# Patient Record
Sex: MALE | Race: WHITE | NOT HISPANIC OR LATINO | Employment: UNEMPLOYED | URBAN - METROPOLITAN AREA
[De-identification: names, ages, dates, MRNs, and addresses within clinical notes are randomized per-mention and may not be internally consistent; named-entity substitution may affect disease eponyms.]

---

## 2021-09-15 ENCOUNTER — HOSPITAL ENCOUNTER (INPATIENT)
Facility: CLINIC | Age: 21
LOS: 4 days | Discharge: HOME | End: 2021-09-20
Attending: EMERGENCY MEDICINE | Admitting: PSYCHIATRY & NEUROLOGY
Payer: MEDICAID

## 2021-09-15 DIAGNOSIS — Z11.52 ENCOUNTER FOR SCREENING LABORATORY TESTING FOR SEVERE ACUTE RESPIRATORY SYNDROME CORONAVIRUS 2 (SARS-COV-2): ICD-10-CM

## 2021-09-15 DIAGNOSIS — F43.23 ADJUSTMENT DISORDER WITH MIXED ANXIETY AND DEPRESSED MOOD: ICD-10-CM

## 2021-09-15 DIAGNOSIS — F10.10 ALCOHOL ABUSE: ICD-10-CM

## 2021-09-15 DIAGNOSIS — F33.0 MAJOR DEPRESSIVE DISORDER, RECURRENT EPISODE, MILD (H): Primary | ICD-10-CM

## 2021-09-15 DIAGNOSIS — F12.10 MARIJUANA ABUSE: ICD-10-CM

## 2021-09-15 LAB
ALCOHOL BREATH TEST: 0 (ref 0–0.01)
AMPHETAMINES UR QL SCN: ABNORMAL
BARBITURATES UR QL: ABNORMAL
BENZODIAZ UR QL: ABNORMAL
CANNABINOIDS UR QL SCN: ABNORMAL
COCAINE UR QL: ABNORMAL
OPIATES UR QL SCN: ABNORMAL
SARS-COV-2 RNA RESP QL NAA+PROBE: NEGATIVE

## 2021-09-15 PROCEDURE — 82075 ASSAY OF BREATH ETHANOL: CPT | Performed by: EMERGENCY MEDICINE

## 2021-09-15 PROCEDURE — 99284 EMERGENCY DEPT VISIT MOD MDM: CPT | Performed by: EMERGENCY MEDICINE

## 2021-09-15 PROCEDURE — 90791 PSYCH DIAGNOSTIC EVALUATION: CPT

## 2021-09-15 PROCEDURE — 99285 EMERGENCY DEPT VISIT HI MDM: CPT | Mod: 25 | Performed by: EMERGENCY MEDICINE

## 2021-09-15 PROCEDURE — 80307 DRUG TEST PRSMV CHEM ANLYZR: CPT | Performed by: EMERGENCY MEDICINE

## 2021-09-15 PROCEDURE — C9803 HOPD COVID-19 SPEC COLLECT: HCPCS | Performed by: EMERGENCY MEDICINE

## 2021-09-15 PROCEDURE — U0005 INFEC AGEN DETEC AMPLI PROBE: HCPCS | Performed by: EMERGENCY MEDICINE

## 2021-09-15 ASSESSMENT — ENCOUNTER SYMPTOMS
DYSPHORIC MOOD: 1
HALLUCINATIONS: 0
SLEEP DISTURBANCE: 0

## 2021-09-15 NOTE — ED TRIAGE NOTES
Patient seeking help for SI  Father with patient   Patient tried to hang himself a couple months ago.

## 2021-09-16 PROBLEM — R45.851 SUICIDAL IDEATION: Status: ACTIVE | Noted: 2021-09-16

## 2021-09-16 PROCEDURE — 250N000013 HC RX MED GY IP 250 OP 250 PS 637: Performed by: NURSE PRACTITIONER

## 2021-09-16 PROCEDURE — H0001 ALCOHOL AND/OR DRUG ASSESS: HCPCS | Performed by: COUNSELOR

## 2021-09-16 PROCEDURE — 124N000002 HC R&B MH UMMC

## 2021-09-16 PROCEDURE — 99223 1ST HOSP IP/OBS HIGH 75: CPT | Mod: AI | Performed by: NURSE PRACTITIONER

## 2021-09-16 RX ORDER — IBUPROFEN 200 MG
200 TABLET ORAL EVERY 4 HOURS PRN
COMMUNITY

## 2021-09-16 RX ORDER — OLANZAPINE 10 MG/2ML
10 INJECTION, POWDER, FOR SOLUTION INTRAMUSCULAR 3 TIMES DAILY PRN
Status: DISCONTINUED | OUTPATIENT
Start: 2021-09-16 | End: 2021-09-20 | Stop reason: HOSPADM

## 2021-09-16 RX ORDER — LOPERAMIDE HCL 2 MG
2 CAPSULE ORAL 4 TIMES DAILY PRN
Status: DISCONTINUED | OUTPATIENT
Start: 2021-09-16 | End: 2021-09-20 | Stop reason: HOSPADM

## 2021-09-16 RX ORDER — IBUPROFEN 600 MG/1
600 TABLET, FILM COATED ORAL EVERY 6 HOURS PRN
Status: DISCONTINUED | OUTPATIENT
Start: 2021-09-16 | End: 2021-09-20 | Stop reason: HOSPADM

## 2021-09-16 RX ORDER — FOLIC ACID 1 MG/1
1 TABLET ORAL DAILY
Status: DISCONTINUED | OUTPATIENT
Start: 2021-09-16 | End: 2021-09-20 | Stop reason: HOSPADM

## 2021-09-16 RX ORDER — MAGNESIUM HYDROXIDE/ALUMINUM HYDROXICE/SIMETHICONE 120; 1200; 1200 MG/30ML; MG/30ML; MG/30ML
30 SUSPENSION ORAL EVERY 4 HOURS PRN
Status: DISCONTINUED | OUTPATIENT
Start: 2021-09-16 | End: 2021-09-20 | Stop reason: HOSPADM

## 2021-09-16 RX ORDER — OLANZAPINE 10 MG/1
10 TABLET ORAL 3 TIMES DAILY PRN
Status: DISCONTINUED | OUTPATIENT
Start: 2021-09-16 | End: 2021-09-20 | Stop reason: HOSPADM

## 2021-09-16 RX ORDER — LANOLIN ALCOHOL/MO/W.PET/CERES
100 CREAM (GRAM) TOPICAL DAILY
Status: DISCONTINUED | OUTPATIENT
Start: 2021-09-16 | End: 2021-09-20 | Stop reason: HOSPADM

## 2021-09-16 RX ORDER — ACETAMINOPHEN 325 MG/1
650 TABLET ORAL EVERY 4 HOURS PRN
Status: DISCONTINUED | OUTPATIENT
Start: 2021-09-16 | End: 2021-09-20 | Stop reason: HOSPADM

## 2021-09-16 RX ORDER — HYDROXYZINE HYDROCHLORIDE 25 MG/1
25 TABLET, FILM COATED ORAL EVERY 4 HOURS PRN
Status: DISCONTINUED | OUTPATIENT
Start: 2021-09-16 | End: 2021-09-16

## 2021-09-16 RX ORDER — DIAZEPAM 5 MG
5-20 TABLET ORAL EVERY 30 MIN PRN
Status: DISCONTINUED | OUTPATIENT
Start: 2021-09-16 | End: 2021-09-17

## 2021-09-16 RX ORDER — VENLAFAXINE HYDROCHLORIDE 37.5 MG/1
75 TABLET, EXTENDED RELEASE ORAL
Status: DISCONTINUED | OUTPATIENT
Start: 2021-09-16 | End: 2021-09-20 | Stop reason: HOSPADM

## 2021-09-16 RX ORDER — LANOLIN ALCOHOL/MO/W.PET/CERES
3 CREAM (GRAM) TOPICAL
Status: DISCONTINUED | OUTPATIENT
Start: 2021-09-16 | End: 2021-09-20 | Stop reason: HOSPADM

## 2021-09-16 RX ORDER — GABAPENTIN 100 MG/1
200 CAPSULE ORAL 3 TIMES DAILY PRN
Status: DISCONTINUED | OUTPATIENT
Start: 2021-09-16 | End: 2021-09-20 | Stop reason: HOSPADM

## 2021-09-16 RX ORDER — MULTIPLE VITAMINS W/ MINERALS TAB 9MG-400MCG
1 TAB ORAL DAILY
Status: DISCONTINUED | OUTPATIENT
Start: 2021-09-16 | End: 2021-09-20 | Stop reason: HOSPADM

## 2021-09-16 RX ORDER — ONDANSETRON 4 MG/1
4 TABLET, ORALLY DISINTEGRATING ORAL EVERY 6 HOURS PRN
Status: DISCONTINUED | OUTPATIENT
Start: 2021-09-16 | End: 2021-09-20 | Stop reason: HOSPADM

## 2021-09-16 RX ADMIN — MULTIPLE VITAMINS W/ MINERALS TAB 1 TABLET: TAB at 11:55

## 2021-09-16 RX ADMIN — VENLAFAXINE HYDROCHLORIDE 75 MG: 37.5 TABLET, EXTENDED RELEASE ORAL at 11:55

## 2021-09-16 RX ADMIN — THIAMINE HCL TAB 100 MG 100 MG: 100 TAB at 11:55

## 2021-09-16 RX ADMIN — FOLIC ACID 1 MG: 1 TABLET ORAL at 11:55

## 2021-09-16 ASSESSMENT — ACTIVITIES OF DAILY LIVING (ADL)
HYGIENE/GROOMING: INDEPENDENT
HYGIENE/GROOMING: HANDWASHING;INDEPENDENT
ORAL_HYGIENE: INDEPENDENT
DRESS: INDEPENDENT
DRESS: STREET CLOTHES;SCRUBS (BEHAVIORAL HEALTH);INDEPENDENT
ORAL_HYGIENE: INDEPENDENT
FALL_HISTORY_WITHIN_LAST_SIX_MONTHS: NO
LAUNDRY: WITH SUPERVISION
DIFFICULTY_EATING/SWALLOWING: NO
HYGIENE/GROOMING: INDEPENDENT
DOING_ERRANDS_INDEPENDENTLY_DIFFICULTY: NO
ORAL_HYGIENE: INDEPENDENT
WALKING_OR_CLIMBING_STAIRS_DIFFICULTY: NO
DRESS: INDEPENDENT
WEAR_GLASSES_OR_BLIND: YES
DIFFICULTY_COMMUNICATING: NO
CONCENTRATING,_REMEMBERING_OR_MAKING_DECISIONS_DIFFICULTY: YES
DRESSING/BATHING_DIFFICULTY: NO
LAUNDRY: WITH SUPERVISION
LAUNDRY: WITH SUPERVISION
TOILETING_ISSUES: NO

## 2021-09-16 ASSESSMENT — MIFFLIN-ST. JEOR: SCORE: 1916.18

## 2021-09-16 NOTE — PROGRESS NOTES
"Pt admitted to [facility] from [facility], Voluntary with suicidal ideation and no specific plan at this time,appeared,sad and depressed. Pt said he was tired, however, stated he is \"here to get help,\" cooperative during admission process.    Pt says he has had previous psych admissions prior to this one and also has a history of substance abuse utox positive for cannabinoids. He said he couldn't remember but said he wanted to hang himself in July. He feels safe while on the unit and would like to be \"put on medications that work,\" and treatment. Pt is currently homeless. Pt has slept approximately 2.75 Hours during the night, on Status 15 minute safety checks, no prn's given.  "
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
## 2021-09-16 NOTE — PROGRESS NOTES
09/16/21 0234   Patient Belongings   Did you bring any home meds/supplements to the hospital?  Yes   Patient Belongings locker;sent to security per site process   Patient Belongings Put in Hospital Secure Location (Security or Locker, etc.) cash/credit card;cell phone/electronics;clothing;shoes   Belongings Search Yes   Clothing Search Yes   Second Staff Harvey   Comment See Note     In Locker: 2 sweatshirts, 1 pair of pant, 1 belt, 1 [place] Identification card, 4 chargers, 1 speaker, 1 cell phone, 1 back bag, 1 Ipod, 2 lighter, and 1 ear pod    Sent to security: 1 visa card end #[id number]   A               Admission:  I am responsible for any personal items that are not sent to the safe or pharmacy.  [facility] is not responsible for loss, theft or damage of any property in my possession.    Signature:  _________________________________ Date: _______  Time: _____                                              Staff Signature:  ____________________________ Date: ________  Time: _____      2nd Staff person, if patient is unable/unwilling to sign:    Signature: ________________________________ Date: ________  Time: _____     Discharge:  [facility] has returned all of my personal belongings:    Signature: _________________________________ Date: ________  Time: _____                                          Staff Signature:  ____________________________ Date: ________  Time: _____
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
## 2021-09-16 NOTE — PLAN OF CARE
Problem: General Plan of Care (Inpatient Behavioral)  Goal: Individualization/Patient Specific Goal (IP Behavioral)  Description: The patient and/or their representative will achieve their patient-specific goals related to the plan of care.    The patient-specific goals include:  Flowsheets (Taken 9/16/2021 1401)  Areas of Vulnerability: Homeless, unemployed, substance use problems  Patient Strengths:   Awareness of substance use issues   Motivated and ready for change  Note: Personal Plan of Care    Reasons you are in the Hospital  Suicidal  2.Depression  3. Homeless  4. Substance use.    Goals for Discharge   Get into [facility]  2.    Get life back on track.

## 2021-09-16 NOTE — SAFE
Vignesh Donahue  September 15, 2021  SAFE Note    Critical Safety Issues: SI       Current Suicidal Ideation/Self-Injurious Concerns/Methods: Other hanging      Current or Historical Inappropriate Sexual Behavior: No      Current or Historical Aggression/Homicidal Ideation: Agitation/Hyperactivity as a child. No current.     Updated care team: Yes: MD and RN     For additional details see full [facility] assessment.       Kathrine Burgess, [facility] HospitalSW

## 2021-09-16 NOTE — PLAN OF CARE
BEHAVIORAL TEAM DISCUSSION    Participants: JOSE Freitas, NIKI Gurrola, Elizabeth Buckley, RN  Progress: Minimal, just admitted  Anticipated length of stay: 5-7 days.  Continued Stay Criteria/Rationale: Pt is reporting suicidal ideations with intend to act on thoughts.  Medical/Physical: No  Precautions:   Behavioral Orders   Procedures    Code 1 - Restrict to Unit    Discontinue 1:1 attendant for suicide risk     Order Specific Question:   I have performed an in person assessment of the patient     Answer:   Based on this assessment the patient no longer requires a one on one attendant at this point in time.     Order Specific Question:   Rationale     Answer:   Patient States able to remain safe in hospital    Routine Programming     As clinically indicated    Status 15     Every 15 minutes.    Suicide precautions     Patients on Suicide Precautions should have a Combination Diet ordered that includes a Diet selection(s) AND a Behavioral Tray selection for Safe Tray - with utensils, or Safe Tray - NO utensils      Withdrawal precautions     Plan: The plan is to assess the patient for mental health and medication needs.  The patient will be prescribed medications to treat the identified symptoms.  Upon discharge the patient will be referred to services as appropriate based on the assessment.  Pt will be referred for Substance Use Evaluation.  Rationale for change in precautions or plan: No changes.

## 2021-09-16 NOTE — PLAN OF CARE
Pt. Willing to engage in a 1:1 with staff.  Pt. States that he is comfortable on the unit, is able to make requests of staff, and has no questions at this time.  Pt. Reports that the phone interview for a CD assessment went well, that he plans on attending CD treatment.  The pt. States that he has been in the hospital on a psychiatric unit and he feels safe on the unit.  The pt. Denies suicidal ideation, states is motivated for CD treatment.  The pt.'s MSSA is 3; the pt. Does not have any withdrawal symptoms.  The pt.'s thoughts appear reality based and clear.

## 2021-09-16 NOTE — PHARMACY-ADMISSION MEDICATION HISTORY
Admission Medication History Completed by Pharmacy    See [facility] Admission Navigator for allergy information, preferred outpatient pharmacy, prior to admission medications and immunization status.     Medication history sources:  Patient via phone; Chart review     Pertinent changes made to PTA medication list:  Added:   - Ibuprofen 200 mg tablets (per patient)   Deleted: N/A  Changed: N/A    Additional medication history information:   - Patient reports not taking any prescription medications in over a year.   - Patient denies taking any additional Rx/OTC medications other than the ones listed below.    Prior to Admission medications    Medication Sig Last Dose Taking? Auth Provider   ibuprofen (ADVIL/MOTRIN) 200 MG tablet Take 200 mg by mouth every 4 hours as needed for mild pain or other (headache) Past Month at PR[facility] Yes Unknown, Entered By History          Date completed: 09/16/21    Medication history completed by:   Madeline Dawn, PharmD   [facility]  [phone]
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
## 2021-09-16 NOTE — PLAN OF CARE
"Problem: Suicidal Behavior  Goal: Suicidal Behavior is Absent or Managed  Outcome: Initial Assessment-Developing     Pt observed resting on his bed or sleeping during the shift. Is easily aroused by voice. Reports he is here because \"I thought about hanging myself.\" Reports this, coupled his being homeless, and his girlfriend breaking up with him today was difficult. States, \"Two days ago I was sleeping in the woods; this is great.\" Reports that he couldn't continue with his shelter use because he violated curfew. Reports that he feels safe on the unit, stating, \"I've been to [facility] before.\" Reports feels tired and desires to rest. Denies depression; reports anxiety, rating it \"5\"/10; reports that this rating is related to getting \"my life back together\" and understanding what the plan of care for him will be moving forward. Denies hallucinations, SI/SIBI, HI, and pain. Agrees to notify staff if he feels unsafe. Reports that he sometimes has upper chest pain and it \"feels like mucus\" when he coughs deeply. Reports had diarrhea this morning, but not at present. MSSA score x2 is 1. VS WNL during first VS check during shift. Evening systolic BP elevated, other VS WNL; pt denies SOB, dizziness, and pain. Informed him that dinner was saved; pt doesn't desire to eat due to feeling nauseous. Offered PRN Zofran; pt declines, stating again that he just wants to rest due to feeling tired. Encouraged pt to maintain adequate hydration; offered water intake which he received and drank. VS rechecked at 2220 and are WNL. Pt reports that he will likely drink the juice in his room with ice at this time.  "

## 2021-09-17 LAB
ALBUMIN SERPL-MCNC: 3.9 G/DL (ref 3.4–5)
ALP SERPL-CCNC: 60 U/L (ref 40–150)
ALT SERPL W P-5'-P-CCNC: 35 U/L (ref 0–70)
ANION GAP SERPL CALCULATED.3IONS-SCNC: 5 MMOL/L (ref 3–14)
AST SERPL W P-5'-P-CCNC: 21 U/L (ref 0–45)
BASOPHILS # BLD AUTO: 0.1 10E3/UL (ref 0–0.2)
BASOPHILS NFR BLD AUTO: 1 %
BILIRUB SERPL-MCNC: 0.5 MG/DL (ref 0.2–1.3)
BUN SERPL-MCNC: 11 MG/DL (ref 7–30)
CALCIUM SERPL-MCNC: 9.1 MG/DL (ref 8.5–10.1)
CHLORIDE BLD-SCNC: 106 MMOL/L (ref 94–109)
CHOLEST SERPL-MCNC: 184 MG/DL
CO2 SERPL-SCNC: 25 MMOL/L (ref 20–32)
CREAT SERPL-MCNC: 0.81 MG/DL (ref 0.66–1.25)
EOSINOPHIL # BLD AUTO: 0 10E3/UL (ref 0–0.7)
EOSINOPHIL NFR BLD AUTO: 0 %
ERYTHROCYTE [DISTWIDTH] IN BLOOD BY AUTOMATED COUNT: 12.3 % (ref 10–15)
FOLATE SERPL-MCNC: 14.2 NG/ML
GFR SERPL CREATININE-BSD FRML MDRD: >90 ML/MIN/1.73M2
GGT SERPL-CCNC: 43 U/L (ref 0–75)
GLUCOSE BLD-MCNC: 77 MG/DL (ref 70–99)
HCT VFR BLD AUTO: 48.1 % (ref 40–53)
HDLC SERPL-MCNC: 102 MG/DL
HGB BLD-MCNC: 16.1 G/DL (ref 13.3–17.7)
HIV 1+2 AB+HIV1 P24 AG SERPL QL IA: NONREACTIVE
IMM GRANULOCYTES # BLD: 0 10E3/UL
IMM GRANULOCYTES NFR BLD: 0 %
LDLC SERPL CALC-MCNC: 70 MG/DL
LYMPHOCYTES # BLD AUTO: 0.9 10E3/UL (ref 0.8–5.3)
LYMPHOCYTES NFR BLD AUTO: 15 %
MCH RBC QN AUTO: 29.4 PG (ref 26.5–33)
MCHC RBC AUTO-ENTMCNC: 33.5 G/DL (ref 31.5–36.5)
MCV RBC AUTO: 88 FL (ref 78–100)
MONOCYTES # BLD AUTO: 0.8 10E3/UL (ref 0–1.3)
MONOCYTES NFR BLD AUTO: 13 %
NEUTROPHILS # BLD AUTO: 4 10E3/UL (ref 1.6–8.3)
NEUTROPHILS NFR BLD AUTO: 71 %
NONHDLC SERPL-MCNC: 82 MG/DL
NRBC # BLD AUTO: 0 10E3/UL
NRBC BLD AUTO-RTO: 0 /100
PLATELET # BLD AUTO: 264 10E3/UL (ref 150–450)
POTASSIUM BLD-SCNC: 4.2 MMOL/L (ref 3.4–5.3)
PROT SERPL-MCNC: 7.8 G/DL (ref 6.8–8.8)
RBC # BLD AUTO: 5.47 10E6/UL (ref 4.4–5.9)
SODIUM SERPL-SCNC: 136 MMOL/L (ref 133–144)
T PALLIDUM AB SER QL: NONREACTIVE
T4 FREE SERPL-MCNC: 0.97 NG/DL (ref 0.76–1.46)
TRIGL SERPL-MCNC: 62 MG/DL
TSH SERPL DL<=0.005 MIU/L-ACNC: 0.18 MU/L (ref 0.4–4)
VIT B12 SERPL-MCNC: 609 PG/ML (ref 193–986)
WBC # BLD AUTO: 5.7 10E3/UL (ref 4–11)

## 2021-09-17 PROCEDURE — 82040 ASSAY OF SERUM ALBUMIN: CPT | Performed by: NURSE PRACTITIONER

## 2021-09-17 PROCEDURE — 87389 HIV-1 AG W/HIV-1&-2 AB AG IA: CPT | Performed by: NURSE PRACTITIONER

## 2021-09-17 PROCEDURE — 124N000002 HC R&B MH UMMC

## 2021-09-17 PROCEDURE — 80061 LIPID PANEL: CPT | Performed by: NURSE PRACTITIONER

## 2021-09-17 PROCEDURE — 36415 COLL VENOUS BLD VENIPUNCTURE: CPT | Performed by: NURSE PRACTITIONER

## 2021-09-17 PROCEDURE — 84443 ASSAY THYROID STIM HORMONE: CPT | Performed by: NURSE PRACTITIONER

## 2021-09-17 PROCEDURE — 87591 N.GONORRHOEAE DNA AMP PROB: CPT | Performed by: NURSE PRACTITIONER

## 2021-09-17 PROCEDURE — 85025 COMPLETE CBC W/AUTO DIFF WBC: CPT | Performed by: NURSE PRACTITIONER

## 2021-09-17 PROCEDURE — 84439 ASSAY OF FREE THYROXINE: CPT | Performed by: NURSE PRACTITIONER

## 2021-09-17 PROCEDURE — 82746 ASSAY OF FOLIC ACID SERUM: CPT | Performed by: NURSE PRACTITIONER

## 2021-09-17 PROCEDURE — 82607 VITAMIN B-12: CPT | Performed by: NURSE PRACTITIONER

## 2021-09-17 PROCEDURE — 86780 TREPONEMA PALLIDUM: CPT | Performed by: NURSE PRACTITIONER

## 2021-09-17 PROCEDURE — 82977 ASSAY OF GGT: CPT | Performed by: NURSE PRACTITIONER

## 2021-09-17 PROCEDURE — 87491 CHLMYD TRACH DNA AMP PROBE: CPT | Performed by: NURSE PRACTITIONER

## 2021-09-17 PROCEDURE — 250N000013 HC RX MED GY IP 250 OP 250 PS 637: Performed by: NURSE PRACTITIONER

## 2021-09-17 PROCEDURE — 99232 SBSQ HOSP IP/OBS MODERATE 35: CPT | Performed by: NURSE PRACTITIONER

## 2021-09-17 RX ADMIN — THIAMINE HCL TAB 100 MG 100 MG: 100 TAB at 08:57

## 2021-09-17 RX ADMIN — MULTIPLE VITAMINS W/ MINERALS TAB 1 TABLET: TAB at 08:57

## 2021-09-17 RX ADMIN — VENLAFAXINE HYDROCHLORIDE 75 MG: 37.5 TABLET, EXTENDED RELEASE ORAL at 08:57

## 2021-09-17 RX ADMIN — FOLIC ACID 1 MG: 1 TABLET ORAL at 08:57

## 2021-09-17 ASSESSMENT — ACTIVITIES OF DAILY LIVING (ADL)
HYGIENE/GROOMING: INDEPENDENT
LAUNDRY: WITH SUPERVISION
ORAL_HYGIENE: INDEPENDENT
LAUNDRY: WITH SUPERVISION
ORAL_HYGIENE: INDEPENDENT
HYGIENE/GROOMING: INDEPENDENT
DRESS: INDEPENDENT
DRESS: INDEPENDENT

## 2021-09-17 NOTE — PLAN OF CARE
Assessment/Intervention/Current Symtoms and Care Coordination  Attended team meeting and addressed patient concerns  Reviewed chart notes.  Met with patient and obtained social security number    Contacted [facility] Clinical Review team ([phone]) - they were able to provide patient's PMI #[id number] and that patient's insurance is currently inactive    Contacted [facility] office (*[id number]) and spoke with Jihan, gave her the SSN and PMI numbers.  She will have someone from the business office contact the pt on the unit re: getting MA reinstated    Contacted [facility] [phone]/fax [phone] to determine whether they would accept a referral with MA pending.  She said they would.  Writer faxed TRACI assessment to Niranjan in intake at [facility]    Entered avoidable days    Updated AVS    Discharge Plan or Goal  3 days, to residential TRACI treatment at [facility] or an equal alternative     Barriers to Discharge   Insurance, creating further vulnerability     Referral Status  [facility]     Legal Status  Voluntary

## 2021-09-17 NOTE — PROGRESS NOTES
NOC Shift Report    Pt in bed at beginning of shift, breathing quiet and unlabored. Pt slept through shift.    No pt complaints or concerns at this time.   No PRNs given. Will continue to monitor.

## 2021-09-17 NOTE — PROGRESS NOTES
"CLINICAL NUTRITION SERVICES - ASSESSMENT NOTE     Nutrition Prescription    RECOMMENDATIONS FOR MDs/PROVIDERS TO ORDER:  None at this time    Malnutrition Status:    Unable to assess--need hx and NFPE    Recommendations already ordered by Registered Dietitian (RD):  Continue current diet orders     Future/Additional Recommendations:  Monitor intake, weight, availability, need for supplement--add prn     REASON FOR ASSESSMENT  Vignesh Donahue is a/an 21 year old male assessed by the dietitian for MST score of 3: positive  for weight loss of 14-23 lbs and positive  for poor oral intake related to decreased appetite    NUTRITION HISTORY  - Unable to obtain nutrition history- pt sleeping  - pt is notably homeless- high risk for food insecurity  - NKFA    CURRENT NUTRITION ORDERS  Diet: Regular  Intake/Tolerance: nausea noted last night, ate a good breakfast, didn't eat lunch per RN    LABS  Labs reviewed- electrolytes, inflammatory markers WNL      MEDICATIONS    folic acid  1 mg Oral Daily     multivitamin w/minerals  1 tablet Oral Daily     thiamine  100 mg Oral Daily     venlafaxine  75 mg Oral Daily with breakfast        ANTHROPOMETRICS  Height: 188 cm (6' 2\")  Most Recent Weight: 83.6 kg (184 lb 6.4 oz)    IBW: 86.3 kg  Body mass index is 23.68 kg/m .  BMI: Normal BMI  Weight History:   Wt Readings from Last 10 Encounters:   09/16/21 83.6 kg (184 lb 6.4 oz)   02/04/15 69.9 kg (154 lb) (95 %, Z= 1.61)*   08/12/12 68.9 kg (152 lb) (>99 %, Z= 2.44)*   07/15/12 68 kg (150 lb) (>99 %, Z= 2.43)*   07/03/12 62.6 kg (137 lb 14.4 oz) (99 %, Z= 2.19)*   10/06/11 60.8 kg (134 lb) (>99 %, Z= 2.37)*   08/19/10 47.2 kg (104 lb) (98 %, Z= 2.09)*   03/23/10 39.9 kg (88 lb) (96 %, Z= 1.71)*   07/28/09 33.3 kg (73 lb 8 oz) (91 %, Z= 1.32)*   10/26/07 24.5 kg (54 lb) (79 %, Z= 0.80)*     * Growth percentiles are based on CDC (Boys, 2-20 Years) data.     Very limited weight records, no Care Everywhere records. Unable to quantify " weight changes at this time.    Dosing Weight: 83.6 kg, current    ASSESSED NUTRITION NEEDS  Estimated Energy Needs: 7687-7651 kcals/day (25 - 30 kcals/kg)  Justification: Maintenance  Estimated Protein Needs:  grams protein/day (1-1.5)  Justification: Repletion  Estimated Fluid Needs:  (1 mL/kcal)   Justification: Maintenance    MALNUTRITION  % Intake: Unable to assess  % Weight Loss: Unable to assess  Subcutaneous Fat Loss: Unable to assess  Muscle Loss: Unable to assess  Fluid Accumulation/Edema: Unable to assess  Malnutrition Diagnosis: Unable to determine    NUTRITION DIAGNOSIS  Inadequate oral intake related to mental health symptom exacerbation as well as possible recent food insecurity as evidenced by report of poor PO PTA and weight loss.      INTERVENTIONS  Implementation  Nutrition Education: Unable to complete due to pt unavailable   Continue current diet orders, check in with patient early next week     Goals  Patient to consume % of nutritionally adequate meal trays TID, or the equivalent with supplements/snacks.     Monitoring/Evaluation  Progress toward goals will be monitored and evaluated per protocol.  Denice Hart RD, LD  ICU/5A/OB/Mental Health Pager (M-F): [phone]  On Call Pager (weekends only): [phone]

## 2021-09-17 NOTE — PROGRESS NOTES
[facility],  Psychiatric Progress Note      Impression:     Vignesh Donahue is a 21-year-old male admitted to [facility] on 9/16/2021.  He was admitted as a voluntary patient through the ER due to depressive symptoms and suicidal ideation with a plan to hang himself.  He was evicted from his [address] 8 apartment 7/31/2021 due to drug use.  He is homeless.  He was staying at [facility] and was kicked out due to breaking curfew and using substances.  He spent the night outdoors just prior to admission.  He smokes cannabis daily.  He recently started using K2 and uses several times per week.  He consumes 1 L of vodka or whiskey daily with last use the AM of 9/15.  UTOX was positive for cannabinoids.  Breathalyzer was 0.  He has not taken medications for over 1 year.  Since admission, Effexor ER was initiated.  PRNs of Neurontin, Melatonin and Zyprexa were initiated.  He was on the MSSA but did not require treatment with Valium.  Mood is improving and he denies suicidal ideation.         Diagnoses:     Major depressive disorder, severe, recurrent, without psychosis  Social anxiety disorder  Rule out PTSD  Alcohol use disorder, severe, dependence  Cannabis use disorder  Nicotine use disorder         Plan:     Medications:  Continue Effexor ER 75 mg daily.  PRNs of Neurontin, Melatonin and Zyprexa are available.     Labs including STD testing pending.     Discontinue MSSA.     CD consult completed 9/16 with recommendation for [facility].  Discharge to CD treatment when a bed is available.          Attestation:  Patient has been seen and evaluated by me, NIKI Bull CNP  The patient was counseled on nature of illness and treatment plan/options  Care was coordinated with treatment team         Clinical Global Impressions  First:  Considering your total clinical experience with this particular patient population, how severe are the  patient's symptoms at this time?: 6 (09/16/21 1046)  Compared to the patient's condition at the START of treatment, this patient's condition is: 4 (09/16/21 1046)  Most recent:  Considering your total clinical experience with this particular patient population, how severe are the patient's symptoms at this time?: 6 (09/16/21 1046)  Compared to the patient's condition at the START of treatment, this patient's condition is: 4 (09/16/21 1046)           Interim History:     The patient's care was discussed with the treatment team and chart notes were reviewed.  Pt was documented as sleeping 7 hours during the overnight shift.  MSSA scores have been low and he has not required treatment with Valium.  He complains of diarrhea.  Discussed that PRN Imodium is available.  He otherwise feels well physically.  States mood is improved and he denies suicidal ideation.  He completed his CD evaluation yesterday and is hoping for discharge to CD treatment soon.  No insurance is noted in his chart, but he states he does have insurance.  CTC notified.  Pt inquired about keeping his phone on his room.  Advised him this is not allowed but did approve brief use of phone under staff supervision to text friends and family to give them info regarding his whereabouts.          Medications:     Current Facility-Administered Medications   Medication     acetaminophen (TYLENOL) tablet 650 mg     alum & mag hydroxide-simethicone (MAALOX) suspension 30 mL     folic acid (FOLVITE) tablet 1 mg     gabapentin (NEURONTIN) capsule 200 mg     ibuprofen (ADVIL/MOTRIN) tablet 600 mg     loperamide (IMODIUM) capsule 2 mg     melatonin tablet 3 mg     multivitamin w/minerals (THERA-VIT-M) tablet 1 tablet     nicotine (NICORETTE) gum 4 mg     OLANZapine (zyPREXA) tablet 10 mg    Or     OLANZapine (zyPREXA) injection 10 mg     ondansetron (ZOFRAN-ODT) ODT tab 4 mg     thiamine (B-1) tablet 100 mg     venlafaxine (EFFEXOR-ER) 24 hr tablet 75 mg              "Allergies:     Allergies   Allergen Reactions     Abilify Discmelt      dystonia     Dust Mites      Seasonal Allergies             Psychiatric Examination:     /72   Pulse 75   Temp 98.7  F (37.1  C) (Oral)   Resp 16   Ht 1.88 m (6' 2\")   Wt 83.6 kg (184 lb 6.4 oz)   SpO2 97%   BMI 23.68 kg/m      Appearance:  awake, alert, adequate grooming  Attitude:  cooperative  Eye Contact:  minimal  Mood:  better, more hopeful, less depressed, less anxious  Affect:  intensity is mildly heightened  Speech:  clear, coherent  Psychomotor Behavior:  no evidence of tardive dyskinesia, dystonia, or tics  Thought Process:  linear and goal oriented  Associations:  no loose associations  Thought Content:  no evidence of psychotic thought, denies homicidal ideation, denies suicidal ideation   Insight:  fair  Judgment:  fair  Oriented to:  date, time, person, and place  Attention Span and Concentration:  some impairment  Recent and Remote Memory:  fair  Language:  intact, fluent English  Fund of Knowledge:  appropriate  Muscle Strength and Tone:  normal  Gait and Station:  normal          Labs:     Recent Results (from the past 24 hour(s))   Comprehensive metabolic panel    Collection Time: 09/17/21  8:17 AM   Result Value Ref Range    Sodium 136 133 - 144 mmol/L    Potassium 4.2 3.4 - 5.3 mmol/L    Chloride 106 94 - 109 mmol/L    Carbon Dioxide (CO2) 25 20 - 32 mmol/L    Anion Gap 5 3 - 14 mmol/L    Urea Nitrogen 11 7 - 30 mg/dL    Creatinine 0.81 0.66 - 1.25 mg/dL    Calcium 9.1 8.5 - 10.1 mg/dL    Glucose 77 70 - 99 mg/dL    Alkaline Phosphatase 60 40 - 150 U/L    AST 21 0 - 45 U/L    ALT 35 0 - 70 U/L    Protein Total 7.8 6.8 - 8.8 g/dL    Albumin 3.9 3.4 - 5.0 g/dL    Bilirubin Total 0.5 0.2 - 1.3 mg/dL    GFR Estimate >90 >60 mL/min/1.73m2   TSH with free T4 reflex    Collection Time: 09/17/21  8:17 AM   Result Value Ref Range    TSH 0.18 (L) 0.40 - 4.00 mU/L   Lipid panel reflex to direct LDL    Collection Time: " 09/17/21  8:17 AM   Result Value Ref Range    Cholesterol 184 <200 mg/dL    Triglycerides 62 <150 mg/dL    Direct Measure  >=40 mg/dL    LDL Cholesterol Calculated 70 <=100 mg/dL    Non HDL Cholesterol 82 <130 mg/dL   GGT    Collection Time: 09/17/21  8:17 AM   Result Value Ref Range    GGT 43 0 - 75 U/L   CBC with platelets and differential    Collection Time: 09/17/21  8:17 AM   Result Value Ref Range    WBC Count 5.7 4.0 - 11.0 10e3/uL    RBC Count 5.47 4.40 - 5.90 10e6/uL    Hemoglobin 16.1 13.3 - 17.7 g/dL    Hematocrit 48.1 40.0 - 53.0 %    MCV 88 78 - 100 fL    MCH 29.4 26.5 - 33.0 pg    MCHC 33.5 31.5 - 36.5 g/dL    RDW 12.3 10.0 - 15.0 %    Platelet Count 264 150 - 450 10e3/uL    % Neutrophils 71 %    % Lymphocytes 15 %    % Monocytes 13 %    % Eosinophils 0 %    % Basophils 1 %    % Immature Granulocytes 0 %    NRBCs per 100 WBC 0 <1 /100    Absolute Neutrophils 4.0 1.6 - 8.3 10e3/uL    Absolute Lymphocytes 0.9 0.8 - 5.3 10e3/uL    Absolute Monocytes 0.8 0.0 - 1.3 10e3/uL    Absolute Eosinophils 0.0 0.0 - 0.7 10e3/uL    Absolute Basophils 0.1 0.0 - 0.2 10e3/uL    Absolute Immature Granulocytes 0.0 <=0.0 10e3/uL    Absolute NRBCs 0.0 10e3/uL   T4 free    Collection Time: 09/17/21  8:17 AM   Result Value Ref Range    Free T4 0.97 0.76 - 1.46 ng/dL

## 2021-09-17 NOTE — PLAN OF CARE
"Patient appeared a little irritable but was calm the rest of shift. Urine was sent for chlamydia and gonorrhea, still pending right now. Patient denied suicidal thoughts thoughts of self-harm. He asked about of his prn medications and was told what they are, but he didn't request any so far today. MSSA was 3 and was subsequently discontinued by provider. Patient has been visible in the milieu but withdrawn and did not attend groups. Will continue to monitor.Blood pressure 136/84, pulse 70, temperature 97.3  F (36.3  C), temperature source Temporal, resp. rate 16, height 1.88 m (6' 2\"), weight 83.6 kg (184 lb 6.4 oz), SpO2 98 %.   "

## 2021-09-18 LAB
C TRACH DNA SPEC QL NAA+PROBE: NEGATIVE
N GONORRHOEA DNA SPEC QL NAA+PROBE: NEGATIVE

## 2021-09-18 PROCEDURE — 124N000002 HC R&B MH UMMC

## 2021-09-18 PROCEDURE — 250N000013 HC RX MED GY IP 250 OP 250 PS 637: Performed by: NURSE PRACTITIONER

## 2021-09-18 RX ADMIN — VENLAFAXINE HYDROCHLORIDE 75 MG: 37.5 TABLET, EXTENDED RELEASE ORAL at 09:03

## 2021-09-18 RX ADMIN — MULTIPLE VITAMINS W/ MINERALS TAB 1 TABLET: TAB at 09:03

## 2021-09-18 RX ADMIN — THIAMINE HCL TAB 100 MG 100 MG: 100 TAB at 09:03

## 2021-09-18 RX ADMIN — FOLIC ACID 1 MG: 1 TABLET ORAL at 09:03

## 2021-09-18 ASSESSMENT — ACTIVITIES OF DAILY LIVING (ADL)
ORAL_HYGIENE: INDEPENDENT
HYGIENE/GROOMING: INDEPENDENT
DRESS: INDEPENDENT

## 2021-09-18 NOTE — PLAN OF CARE
Problem: Adult Inpatient Plan of Care  Goal: Plan of Care Review  9/18/2021 1521 by Cookie Peña, RN  Outcome: No Change  9/18/2021 1120 by Cookie Peña, RN  Outcome: No Change    Pt became frustrated and request 12 hour request to leave due to not having cell phone and wanting to go outside.  Pt signed intent and MD notified.  Pt changed his mind and decided to stay recognizing his decision was impulsive.

## 2021-09-18 NOTE — PLAN OF CARE
Problem: Adult Inpatient Plan of Care  Goal: Plan of Care Review  Outcome: Improving  Flowsheets (Taken 9/18/2021 1722)  Plan of Care Reviewed With: patient     Problem: Behavioral Health Plan of Care  Goal: Plan of Care Review  Outcome: Improving  Flowsheets (Taken 9/18/2021 1722)  Plan of Care Reviewed With: patient  Patient Agreement with Plan of Care: agrees     Problem: Suicidal Behavior  Goal: Suicidal Behavior is Absent or Managed  Outcome: Improving    Patient is pleasant, flat and blunted affect, calm, cooperative with assessments. Reports wanting to go home on Monday. Patient had initially signed a 12 hour intent to leave form, but rescinded. Patient is talking of wanting his phone on the unit, writer explained to the patient the reasons for not having cell phones on the unit, including the risk of HIPAA and patients' privacy; patient demonstrated understanding.    Patient is visible on the unit, watching movies, engaged with select peers.    Reports mild anxiety rated with admission, denies any other psych symptoms, contracts for safety.    Declined vital signs. Denies pain.    Staff will continue to monitor patient closely.

## 2021-09-18 NOTE — PLAN OF CARE
Calm, cooperative, pleasant upon approach and has been keeping to himself mostly. Reviewed plan of care for the evening. Said he has discharge plans for Monday and that he was going to go to treatment and then decided that the homeless shelter has more to offer him. Concerned about having proof to show the shelter that he was no longer in the hospital. Said the discharge papers that will be given to him are sufficient to show the shelter. Did not attend evening group.     Continues on suicide and withdrawal precautions

## 2021-09-18 NOTE — PLAN OF CARE
Problem: Adult Inpatient Plan of Care  Goal: Plan of Care Review  Outcome: No Change     Pt is pleasant and cooperative.  Pt has a full-range affect.  Pt speaks openly to writer about being homeless and his poly-substance abuse and is path to sobriety and reaching his goals.  Pt is out on unit but quiet.  Pt has good insight on his mental health and states his new meds seem to be working well for him.   Pt denies any mental health concerns at this time.

## 2021-09-19 VITALS
WEIGHT: 184.4 LBS | OXYGEN SATURATION: 97 % | BODY MASS INDEX: 23.66 KG/M2 | HEIGHT: 74 IN | RESPIRATION RATE: 16 BRPM | TEMPERATURE: 98 F | DIASTOLIC BLOOD PRESSURE: 82 MMHG | HEART RATE: 67 BPM | SYSTOLIC BLOOD PRESSURE: 125 MMHG

## 2021-09-19 PROCEDURE — 250N000013 HC RX MED GY IP 250 OP 250 PS 637: Performed by: NURSE PRACTITIONER

## 2021-09-19 PROCEDURE — 124N000002 HC R&B MH UMMC

## 2021-09-19 RX ADMIN — THIAMINE HCL TAB 100 MG 100 MG: 100 TAB at 09:32

## 2021-09-19 RX ADMIN — VENLAFAXINE HYDROCHLORIDE 75 MG: 37.5 TABLET, EXTENDED RELEASE ORAL at 09:32

## 2021-09-19 RX ADMIN — FOLIC ACID 1 MG: 1 TABLET ORAL at 09:33

## 2021-09-19 RX ADMIN — MULTIPLE VITAMINS W/ MINERALS TAB 1 TABLET: TAB at 09:32

## 2021-09-19 ASSESSMENT — ACTIVITIES OF DAILY LIVING (ADL)
DRESS: STREET CLOTHES
HYGIENE/GROOMING: INDEPENDENT
ORAL_HYGIENE: INDEPENDENT
HYGIENE/GROOMING: INDEPENDENT
ORAL_HYGIENE: INDEPENDENT
DRESS: INDEPENDENT

## 2021-09-19 NOTE — PLAN OF CARE
"Patient was \"trying to sleep in\" this morning and said he wasn't hungry for breakfast. Patient was pleasant upon approach and was compliant with medications. He denied suicidal thoughts, anxiety and auditory, visual hallucinations. Patient did not request any prn meds this shift. Patient had no requests to leave today. Nursing continues to monitor.  Patient declined vital signs this morning.  "

## 2021-09-19 NOTE — PLAN OF CARE
Pt appeared to sleep 6.75 hours. No safety issues or concerns reported. Safety checks done at least every 15 minutes. Will continue to monitor.

## 2021-09-19 NOTE — PLAN OF CARE
Problem: Behavioral Health Plan of Care  Goal: Plan of Care Review  Outcome: Improving  Flowsheets (Taken 9/19/2021 5597)  Plan of Care Reviewed With: patient  Patient Agreement with Plan of Care: agrees     Problem: Suicidal Behavior  Goal: Suicidal Behavior is Absent or Managed  Outcome: Improving     Problem: General Plan of Care (Inpatient Behavioral)  Goal: Individualization/Patient Specific Goal (IP Behavioral)  Description: The patient and/or their representative will achieve their patient-specific goals related to the plan of care.    The patient-specific goals include:  Outcome: Improving    Patient is pleasant, full range affect, visible on the unit, watching TV for the most part of the shift. Calm, cooperative with nursing assessment. Engaged with select peers.     Patient denies having any psych symptoms, contracts for safety.     Vital signs stable, denies pain.    Consumed dinner and snack.    No bedtime scheduled medication, no PRN requested.     Staff will continue to monitor patient closely.

## 2021-09-20 PROCEDURE — 99239 HOSP IP/OBS DSCHRG MGMT >30: CPT | Performed by: NURSE PRACTITIONER

## 2021-09-20 PROCEDURE — 250N000013 HC RX MED GY IP 250 OP 250 PS 637: Performed by: NURSE PRACTITIONER

## 2021-09-20 RX ORDER — FOLIC ACID 1 MG/1
1 TABLET ORAL DAILY
Qty: 30 TABLET | Refills: 1 | Status: SHIPPED | OUTPATIENT
Start: 2021-09-20 | End: 2024-09-11

## 2021-09-20 RX ORDER — MULTIPLE VITAMINS W/ MINERALS TAB 9MG-400MCG
1 TAB ORAL DAILY
Qty: 30 TABLET | Refills: 1 | Status: SHIPPED | OUTPATIENT
Start: 2021-09-20 | End: 2024-09-11

## 2021-09-20 RX ORDER — LANOLIN ALCOHOL/MO/W.PET/CERES
100 CREAM (GRAM) TOPICAL DAILY
Qty: 30 TABLET | Refills: 1 | Status: SHIPPED | OUTPATIENT
Start: 2021-09-20 | End: 2024-09-11

## 2021-09-20 RX ORDER — VENLAFAXINE HYDROCHLORIDE 75 MG/1
75 TABLET, EXTENDED RELEASE ORAL
Qty: 30 TABLET | Refills: 1 | Status: SHIPPED | OUTPATIENT
Start: 2021-09-20 | End: 2024-10-03

## 2021-09-20 RX ADMIN — MULTIPLE VITAMINS W/ MINERALS TAB 1 TABLET: TAB at 09:36

## 2021-09-20 RX ADMIN — VENLAFAXINE HYDROCHLORIDE 75 MG: 37.5 TABLET, EXTENDED RELEASE ORAL at 09:36

## 2021-09-20 RX ADMIN — THIAMINE HCL TAB 100 MG 100 MG: 100 TAB at 09:36

## 2021-09-20 RX ADMIN — FOLIC ACID 1 MG: 1 TABLET ORAL at 09:36

## 2021-09-20 NOTE — PLAN OF CARE
Assessment/Intervention/Current Symptoms and Care Coordination  Attend Team Meeting  Review Chart   Complete AVS    Discharge Plan or Goal  Discharge to shelter  Referral to [facility] urgent [facility]    Barriers to Discharge   Pt has no insurance    Referral Status  [facility] Urgent [facility]    Legal Status  Voluntary

## 2021-09-20 NOTE — DISCHARGE INSTRUCTIONS
Behavioral Discharge Planning and Instructions    Summary: You were admitted on 9/15/2021  due to Depression, Anxiety and Chemical Use Issues.  You were treated by Irene PRINCE and discharged on 09/20/2021 from [facility] to Shelter at [facility]. [address] [place] [address]  Phone: [phone]    Main Diagnosis:   Major depressive disorder, severe, recurrent, without psychosis  Social anxiety disorder  Rule out PTSD  Alcohol use disorder, severe, dependence  Cannabis use disorder  Nicotine use disorder    Health Care Follow-up:   You do not currently have active insurance. You have applied for insurance while here. While waiting for this to activate, you can go to the following clinics which offer free or sliding fee services:     [facility]  [address] [place] [address] [phone]    [facility] (also has dental services)  [address] [place] [address] - [phone]    [facility] (also has dental services)  [address] [place], [place] - [id number]  Phone: [phone]     [facility]  [address] [place] [address]  Hours: Monday, Wednesday, and Friday from 1-3pm; Monday through Thursday from 6:30pm-8:30pm   Phone: [phone]    No appointment is necessary; no paperwork; no fee.    [address] [place] [address] Triage Open Monday through Friday  Medical and Behavioral Health Triage is a new service at [address] [place] [address] that brings together community-based mental health agencies, [facility] , and [facility] to address our clients' complex needs. The goal of this program is to reach people that are not already receiving services or that are not already connected to case management who have an urgent concern related to their mental health or substance use disorder.   Care coordinators, peer recovery  specialists, and health professionals at [address] [place] [address] will address clients' physical health, mental health, addiction issues - and also the wrap-around services that they need to stay healthy, including housing, health insurance, and other resources.   Triage is located in [facility] at [address] [place] [address]. It is accessible by police from the parking lot. Everyone else can come through the front door of [address] [place] [address], and follow signs to [facility].     Triage hours:9:000 am - 5:00 pm  Triage Phone Number: [phone]  Location: [address] [place] [address]      Attend all scheduled appointments with your outpatient providers. Call at least 24 hours in advance if you need to reschedule an appointment to ensure continued access to your outpatient providers.     Major Treatments, Procedures and Findings:  You were provided with: a psychiatric assessment, assessed for medical stability, medication evaluation and/or management, group therapy and CD evaluation/assessment    Symptoms to Report: feeling more aggressive, increased confusion, losing more sleep, mood getting worse or thoughts of suicide    Early warning signs can include: increased depression or anxiety sleep disturbances increased thoughts or behaviors of suicide or self-harm  increased unusual thinking, such as paranoia or hearing voices    Safety and Wellness:  Take all medicines as directed.  Make no changes unless your doctor suggests them.      Follow treatment recommendations.  Refrain from alcohol and non-prescribed drugs.  Ask your support system to help you reduce your access to items that could harm yourself or others. If there is a concern for safety, call 911.    Resources:   [facility]  [facility] works to ensure that all who seek it have access to the support, care and resources they need to achieve long-term recovery from addiction.  [facility]'s mission is to strengthen the recovery community through vwim-gk-rtgp support, public  "education, and advocacy.  591.678.1000  www.[facility].org    [facility] Dual Diagnosis Support Group  [facility] Dual Diagnosis support group is a peer-led group for adults living both with a mental illness and an addiction. Addictions including sex, gambling, eating disorders, spending, smoking and chemical dependency among others.      The groups are free of charge and meet weekly for 90 minutes. In that time, attendees learn skills that help them deal with their addiction and gain support from people who understand the challenges of having a mental illness and an addiction.     No registration or enrollment required. Please join us for the next support group meeting.     Group Information:     Day and Time: Sundays, 4:00-5:30 pm     [facility], [address] [place] [address] 8th floor room 312   For more information, call Camilla at [phone].  Street parking is recommended. $2 to park in the ramp with a voucher you can get from the facilitators.    Crisis Intervention: 369.690.3235 or 828-902-6154 (TTY: 164.183.3315).  Call anytime for help.  National Dix on Mental Illness (www.[place].kareem.org): 733.379.2409 or 321-062-4339.  Alcoholics Anonymous (www.alcoholics-anonymous.org): Check your phone book for your local chapter.  National Suicide Prevention Line (www.mentalhealthmn.org): 694-653-EUPS (8544)  Mental Health Consumer/Survivor Network of [place] (www.mhcsn.net): 770.770.9896 or 765-589-6228  Mental Health Association of [place] (www.mentalhealth.org): 617.667.3124 or 467-922-0861  Self- Management and Recovery Training., SMART-- Toll free: 878.651.8190  www.[employer].org  [facility] Crisis (COPE) Response - Adult 012 634-0769  [facility] Crisis Response - Adult 248 670-2543  Crisis text line: Text \"[place]\" to 096347. Free, confidential, 24/7.  Crisis Intervention: 936.224.6979 or 514-294-8048. Call anytime for help.     General Medication Instructions: "   See your medication sheet(s) for instructions.   Take all medicines as directed.  Make no changes unless your doctor suggests them.   Go to all your doctor visits.  Be sure to have all your required lab tests. This way, your medicines can be refilled on time.  Do not use any drugs not prescribed by your doctor.  Avoid alcohol.    Advance Directives:   Scanned document on file with [facility]? No scanned doc  Is document scanned? Pt states no documents  Honoring Choices Your Rights Handout: Informed and given  Was more information offered? Materials given    The Treatment team has appreciated the opportunity to work with you. If you have any questions or concerns about your recent admission, you can contact the unit which can receive your call 24 hours a day, 7 days a week. They will be able to get in touch with a Provider if needed. The unit number is [phone]

## 2021-09-20 NOTE — DISCHARGE SUMMARY
"Psychiatric Discharge Summary    Vignesh Donahue MRN# [id number]   Age: 21 year old YOB: 2000     Date of Admission:  9/15/2021  Date of Discharge:  9/20/2021  Admitting Physician:  Yoana Houston MD  Discharge Physician:  NIKI Bull CNP (Contact: [phone])         Event Leading to Hospitalization:      From H&P 9/16/2021:    \"I'm homeless right now, kind of depressed, kind of hopeless because I'm sleeping outside and I got suicidal because of that.\"     Vignesh Donahue is a 21-year-old male admitted to [address] on 9/16/2021.  He was admitted as a voluntary patient through the ER due to depressive symptoms and suicidal ideation with a plan to hang himself.  He was evicted from his [place] [address] apartment 7/31/2021 due to drug use.  He is homeless.  He was staying at [facility] and was kicked out due to breaking curfew and using substances.  He spent the night outdoors just prior to admission.  He smokes cannabis daily.  He recently started using K2 and uses several times per week.  He consumes 1 L of vodka or whiskey daily with last use the AM of 9/15.  UTOX was positive for cannabinoids.  Breathalyzer was 0.  He has not taken medications for over 1 year.      His mood prior to being evicted last summer was \"happy.\"  His mood has become progressively more depressed since then.  He denies current suicidal ideation.  He reports anhedonia.  He said, \"My soul is definitely dead.\"  His sleep is good.  His appetite is lower than usual.  He has lost 20# in a period of several months.  His energy has been lower than usual.  Motivation is fairly low.  He has feelings of hopelessness and worthlessness.  He has some feelings of helplessness.  He struggles with anxiety.  \"I don't like crowds and I don't like making eye contact with people.\"  He denies muscle tension and restlessness.  He says he does not struggle with irritability but does present as such.  " He does have racing thoughts.  He denies panic attacks.  He denies symptoms consistent with kathy and psychosis.  He says he used to wash his hands until they bled and was preoccupied with keeping his room neat, but he denies these symptoms currently.  He denies homicidal ideation.  He has a history of abuse.  He denies intrusive thoughts, nightmares and flashbacks.  He denies avoidance behaviors.  He does feel hypervigilant.  He denies feel easily startled.  He does feel emotionally numb.       See full admission note by Irene Umaña NP 9/16/2021 for details.         Diagnoses:     Major depressive disorder, severe, recurrent, without psychosis  Social anxiety disorder  Rule out PTSD  Alcohol use disorder, severe, dependence  Cannabis use disorder  Nicotine use disorder         Labs:      Ref. Range 9/15/2021 19:39 9/15/2021 19:44 9/15/2021 21:36 9/17/2021 08:17 9/17/2021 10:29   Sodium Latest Ref Range: 133 - 144 mmol/L    136    Potassium Latest Ref Range: 3.4 - 5.3 mmol/L    4.2    Chloride Latest Ref Range: 94 - 109 mmol/L    106    Carbon Dioxide Latest Ref Range: 20 - 32 mmol/L    25    Urea Nitrogen Latest Ref Range: 7 - 30 mg/dL    11    Creatinine Latest Ref Range: 0.66 - 1.25 mg/dL    0.81    GFR Estimate Latest Ref Range: >60 mL/min/1.73m2    >90    Calcium Latest Ref Range: 8.5 - 10.1 mg/dL    9.1    Anion Gap Latest Ref Range: 3 - 14 mmol/L    5    Albumin Latest Ref Range: 3.4 - 5.0 g/dL    3.9    Protein Total Latest Ref Range: 6.8 - 8.8 g/dL    7.8    Bilirubin Total Latest Ref Range: 0.2 - 1.3 mg/dL    0.5    Alkaline Phosphatase Latest Ref Range: 40 - 150 U/L    60    ALT Latest Ref Range: 0 - 70 U/L    35    AST Latest Ref Range: 0 - 45 U/L    21    Cholesterol Latest Ref Range: <200 mg/dL    184    Folate Latest Ref Range: >=5.4 ng/mL    14.2    GGT Latest Ref Range: 0 - 75 U/L    43    HDL Cholesterol Latest Ref Range: >=40 mg/dL    102    LDL Cholesterol Calculated Latest Ref Range: <=100  mg/dL    70    Non HDL Cholesterol Latest Ref Range: <130 mg/dL    82    T4 Free Latest Ref Range: 0.76 - 1.46 ng/dL    0.97    Triglycerides Latest Ref Range: <150 mg/dL    62    TSH Latest Ref Range: 0.40 - 4.00 mU/L    0.18 (L)    Vitamin B12 Latest Ref Range: 193 - 986 pg/mL    609    Glucose Latest Ref Range: 70 - 99 mg/dL    77    WBC Latest Ref Range: 4.0 - 11.0 10e3/uL    5.7    Hemoglobin Latest Ref Range: 13.3 - 17.7 g/dL    16.1    Hematocrit Latest Ref Range: 40.0 - 53.0 %    48.1    Platelet Count Latest Ref Range: 150 - 450 10e3/uL    264    RBC Count Latest Ref Range: 4.40 - 5.90 10e6/uL    5.47    MCV Latest Ref Range: 78 - 100 fL    88    MCH Latest Ref Range: 26.5 - 33.0 pg    29.4    MCHC Latest Ref Range: 31.5 - 36.5 g/dL    33.5    RDW Latest Ref Range: 10.0 - 15.0 %    12.3    % Neutrophils Latest Units: %    71    % Lymphocytes Latest Units: %    15    % Monocytes Latest Units: %    13    % Eosinophils Latest Units: %    0    Absolute Basophils Latest Ref Range: 0.0 - 0.2 10e3/uL    0.1    % Basophils Latest Units: %    1    Absolute Eosinophils Latest Ref Range: 0.0 - 0.7 10e3/uL    0.0    Absolute Immature Granulocytes Latest Ref Range: <=0.0 10e3/uL    0.0    Absolute Lymphocytes Latest Ref Range: 0.8 - 5.3 10e3/uL    0.9    Absolute Monocytes Latest Ref Range: 0.0 - 1.3 10e3/uL    0.8    % Immature Granulocytes Latest Units: %    0    Absolute Neutrophils Latest Ref Range: 1.6 - 8.3 10e3/uL    4.0    Absolute NRBCs Latest Units: 10e3/uL    0.0    NRBCs per 100 WBC Latest Ref Range: <1 /100    0    Treponema Antibodies Latest Ref Range: Nonreactive     Nonreactive    SARS CoV2 PCR Latest Ref Range: Negative    Negative     HIV Antigen Antibody Combo Latest Ref Range: Nonreactive     Nonreactive    Alcohol Breath Test Latest Ref Range: 0.00 - 0.01  0       Amphetamine Qual Urine Latest Ref Range: Screen Negative   Screen Negative      Cocaine Qual Urine Latest Ref Range: Screen Negative    Screen Negative      Benzodiazepine Qual Ur Latest Ref Range: Screen Negative   Screen Negative      Opiates Qualitative Urine Latest Ref Range: Screen Negative   Screen Negative      Cannabinoids Qual Urine Latest Ref Range: Screen Negative   Screen Positive (A)      Barbiturates Qual Urine Latest Ref Range: Screen Negative   Screen Negative      Chlamydia Trachomatis PCR Latest Ref Range: Negative      Negative   N Gonorrhea PCR Latest Ref Range: Negative      Negative            Consults:     Chemical dependency consult was completed 9/16/2021 but no referrals could be placed as he did not have active insurance.          Hospital Course:     Vignesh Donahue was admitted to [facility] with attending Yoana Houston MD, under the direct care of Irene Umaña NP as a voluntary patient.  The patient was placed under status 15 (15 minute checks) to ensure patient safety.     MSSA protocol was initiated due to the patient's history of alcohol abuse and concern for withdrawal symptoms.  He did not exhibit any signs or symptoms of withdrawal and did not require treatment with Valium.    Effexor ER 75 mg daily was initiated.  He tolerated it well, without complaints of side effects.     Vignesh Donahue did participate in groups and was visible in the milieu.  Behavior was calm and cooperative.  He was offered the opportunity for CD treatment but stated that the supervisor at his shelter had informed him he could return, so he elected to pursue this option instead.  He indicated he was considering attending AA/NA and intended to maintain sobriety.     The patient's symptoms of depression improved.  He was hopeful and future-oriented.   He denied suicidal ideation.  He ate and slept well.  There was no evidence of psychosis.      Vignesh Donahue was released to a homeless shelter.  At the time of discharge Vignesh Donahue was determined to not be a danger to himself or others.          Discharge  "Medications:      Vignesh Donahue   Home Medication Instructions LEXIE:[id number]    Printed on:09/20/21 1143   Medication Information                      folic acid (FOLVITE) 1 MG tablet  Take 1 tablet (1 mg) by mouth daily             ibuprofen (ADVIL/MOTRIN) 200 MG tablet  Take 200 mg by mouth every 4 hours as needed for mild pain or other (headache)             multivitamin w/minerals (THERA-VIT-M) tablet  Take 1 tablet by mouth daily             thiamine (B-1) 100 MG tablet  Take 1 tablet (100 mg) by mouth daily             venlafaxine (EFFEXOR-ER) 75 MG 24 hr tablet  Take 1 tablet (75 mg) by mouth daily (with breakfast)                      Psychiatric Examination:     /82 (BP Location: Left arm)   Pulse 67   Temp 98  F (36.7  C) (Temporal)   Resp 16   Ht 1.88 m (6' 2\")   Wt 83.6 kg (184 lb 6.4 oz)   SpO2 97%   BMI 23.68 kg/m      Appearance:  awake, alert, adequate grooming  Attitude:  cooperative  Eye Contact:  good  Mood:  \"good\"  Affect:  normal range  Speech:  clear, coherent  Psychomotor Behavior:  no evidence of tardive dyskinesia, dystonia, or tics  Thought Process:  linear and goal oriented  Associations:  no loose associations  Thought Content:  no evidence of psychotic thought, denies homicidal ideation, denies suicidal ideation   Insight:  fair  Judgment:  fair  Oriented to:  date, time, person, and place  Attention Span and Concentration:  some impairment  Recent and Remote Memory:  fair  Language:  intact, fluent English  Fund of Knowledge:  appropriate  Muscle Strength and Tone:  normal  Gait and Station:  normal          Discharge Plan:     Summary: You were admitted on 9/15/2021 due to depression, anxiety and chemical use issues.  You were treated by Irene PRINCE and discharged on 09/20/2021 from [facility] to shelter at the [facility]. [address] [place] [address].  Phone: [phone]    Health Care Follow-up:   You do not currently have active " insurance. You have applied for insurance while here. While waiting for this to activate, you can go to the following clinics which offer free or sliding fee services:     [facility]  [address] [place] [address]  [phone]    [facility] (also has dental services)  [address] [place], [place] [address]. [address]  [place], [place] - [address]  [phone]    [facility] (also has dental services)  [address]  [place] - [address]  Phone: [phone]     [facility]  [address] [place] [address]  Hours: Monday, Wednesday, and Friday from 1-3pm; Monday through Thursday from 6:30pm-8:30pm   Phone: [phone]    No appointment is necessary; no paperwork; no fee.    [address] [place] [address] Triage Open Monday through Friday  Medical and Behavioral Health Triage is a new service at [address] [place] [address] that brings together community-based mental health agencies, [facility] , and [facility]) to address our clients' complex needs. The goal of this program is to reach people that are not already receiving services or that are not already connected to case management who have an urgent concern related to their mental health or substance use disorder.   Care coordinators, peer recovery specialists, and health professionals at [address] [place] [address] will address clients' physical health, mental health, addiction issues - and also the wrap-around services that they need to stay healthy, including housing, health insurance, and other resources.   Triage is located in [facility] at [address] [place] [address]. It is accessible by police from the parking lot. Everyone else can come through the front door of [address] [place] [address], and follow signs to [facility].     Triage hours:9:000 am - 5:00 pm  Triage Phone Number: [phone]  Location: [address] [place] [address]      He was provided with a 30-day supply of medications plus  one refill.  He was advised to take his medications as prescribed and to abstain from alcohol and illicit substances.       Attestation:  The patient has been seen and evaluated by me, NIKI Bull CNP   Discharge time > 30 minutes      Clinical Global Impressions  First:  Considering your total clinical experience with this particular patient population, how severe are the patient's symptoms at this time?: 6 (09/16/21 1046)  Compared to the patient's condition at the START of treatment, this patient's condition is: 4 (09/16/21 1046)  Most recent:  Considering your total clinical experience with this particular patient population, how severe are the patient's symptoms at this time?: 4 (09/20/21 0919)  Compared to the patient's condition at the START of treatment, this patient's condition is: 2 (09/20/21 0919)

## 2021-09-20 NOTE — PLAN OF CARE
Pt. States ready for discharge and is requesting discharge.  Pt. Reports no suicidal ideation or self-injurious behavior.  Pt. States that his thoughts are clear, reality based.  Pt. Reports that he understands his therapeutic discharge plan and medication regime , has no concerns or questions.  Pt. States that he will follow his therapeutic discharge plan and his medication regime.

## 2021-09-20 NOTE — PLAN OF CARE
Patient slept approximately 7.5 hours during the overnight shift; Currently in bed, appears to be comfortably asleep with even and unlabored breathing. Nursing will continue to monitor.

## 2024-07-25 ENCOUNTER — HOSPITAL ENCOUNTER (EMERGENCY)
Facility: HOSPITAL | Age: 24
Discharge: HOME | End: 2024-07-25
Admitting: PHYSICIAN ASSISTANT
Payer: COMMERCIAL

## 2024-07-25 ENCOUNTER — TELEPHONE (OUTPATIENT)
Dept: NURSING | Facility: CLINIC | Age: 24
End: 2024-07-25

## 2024-07-25 VITALS
OXYGEN SATURATION: 98 % | DIASTOLIC BLOOD PRESSURE: 70 MMHG | HEART RATE: 82 BPM | RESPIRATION RATE: 18 BRPM | SYSTOLIC BLOOD PRESSURE: 126 MMHG | TEMPERATURE: 98.2 F

## 2024-07-25 DIAGNOSIS — J06.9 URI (UPPER RESPIRATORY INFECTION): ICD-10-CM

## 2024-07-25 DIAGNOSIS — Z11.3 SCREEN FOR STD (SEXUALLY TRANSMITTED DISEASE): ICD-10-CM

## 2024-07-25 DIAGNOSIS — O35.5XX0 SUSPECTED DAMAGE TO FETUS FROM DRUGS, AFFECTING MANAGEMENT OF MOTHER, DELIVERED: ICD-10-CM

## 2024-07-25 LAB
FLUAV RNA SPEC QL NAA+PROBE: NEGATIVE
FLUBV RNA RESP QL NAA+PROBE: NEGATIVE
GROUP A STREP BY PCR: NOT DETECTED
HAV IGM SERPL QL IA: NONREACTIVE
HBV CORE IGM SERPL QL IA: NONREACTIVE
HBV SURFACE AG SERPL QL IA: NONREACTIVE
HCV AB SERPL QL IA: NONREACTIVE
HIV 1+2 AB+HIV1 P24 AG SERPL QL IA: NONREACTIVE
RSV RNA SPEC NAA+PROBE: NEGATIVE
SARS-COV-2 RNA RESP QL NAA+PROBE: POSITIVE
T PALLIDUM AB SER QL: NONREACTIVE

## 2024-07-25 PROCEDURE — 99283 EMERGENCY DEPT VISIT LOW MDM: CPT

## 2024-07-25 PROCEDURE — 86780 TREPONEMA PALLIDUM: CPT | Performed by: PHYSICIAN ASSISTANT

## 2024-07-25 PROCEDURE — 87651 STREP A DNA AMP PROBE: CPT | Performed by: PHYSICIAN ASSISTANT

## 2024-07-25 PROCEDURE — 87637 SARSCOV2&INF A&B&RSV AMP PRB: CPT | Performed by: PHYSICIAN ASSISTANT

## 2024-07-25 PROCEDURE — 87389 HIV-1 AG W/HIV-1&-2 AB AG IA: CPT | Performed by: PHYSICIAN ASSISTANT

## 2024-07-25 PROCEDURE — 87491 CHLMYD TRACH DNA AMP PROBE: CPT | Performed by: PHYSICIAN ASSISTANT

## 2024-07-25 PROCEDURE — 36415 COLL VENOUS BLD VENIPUNCTURE: CPT | Performed by: PHYSICIAN ASSISTANT

## 2024-07-25 PROCEDURE — 80074 ACUTE HEPATITIS PANEL: CPT | Performed by: PHYSICIAN ASSISTANT

## 2024-07-25 ASSESSMENT — ACTIVITIES OF DAILY LIVING (ADL): ADLS_ACUITY_SCORE: 35

## 2024-07-25 ASSESSMENT — COLUMBIA-SUICIDE SEVERITY RATING SCALE - C-SSRS
2. HAVE YOU ACTUALLY HAD ANY THOUGHTS OF KILLING YOURSELF IN THE PAST MONTH?: NO
6. HAVE YOU EVER DONE ANYTHING, STARTED TO DO ANYTHING, OR PREPARED TO DO ANYTHING TO END YOUR LIFE?: NO
1. IN THE PAST MONTH, HAVE YOU WISHED YOU WERE DEAD OR WISHED YOU COULD GO TO SLEEP AND NOT WAKE UP?: NO

## 2024-07-25 NOTE — DISCHARGE INSTRUCTIONS
Please call in the next couple of days for your results.  Please call and arrange a primary care appointment to establish care.  Do not hesitate to return to the ER if you feel like your cough is worsening or you develop new symptoms.

## 2024-07-25 NOTE — TELEPHONE ENCOUNTER
Patient calling for results. Advised of the positive Covid. Patient will call back in a couple of days for the rest of the results.     FERNIE JOHNSON RN

## 2024-07-25 NOTE — TELEPHONE ENCOUNTER
Patient classified as COVID treatment eligible by Epic high risk algorithm:  No    Coronavirus (COVID-19) Notification    Reason for call  Notify of POSITIVE COVID-19 lab result, assess symptoms,  review [facility] recommendations    Lab Result   Lab test for 2019-nCoV rRt-PCR or SARS-COV-2 PCR  Oropharyngeal AND/OR nasopharyngeal swabs were POSITIVE for 2019-nCoV RNA [OR] SARS-COV-2 RNA (COVID-19) RNA     We have been unable to reach patient by phone at this time to notify of their Positive COVID-19 result.    Left voicemail message requesting a call back to [phone] [facility] for results.        A Positive COVID-19 letter will be sent via [employer] or the mail.    Melissa Stiles

## 2024-07-25 NOTE — ED TRIAGE NOTES
Pt wants to be checked for HIV and Gonorrhea. He also wants a referral for a primary provider. He is coming from a treatment facility and wants to be checked due to the lack of hygiene when he was an iv drug user.  PT also reports having a cough for 2 weeks.

## 2024-07-25 NOTE — ED PROVIDER NOTES
EMERGENCY DEPARTMENT ENCOUNTER   NAME: Vignesh Donahue ; AGE: 24 year old male ; YOB: 2000 ; MRN: [id number] ; PCP: No Ref-Primary, Physician     Evaluation Date & Time: 7/25/2024 11:15 AM    ED Provider: Sana Miranda PA-C    CHIEF COMPLAINT     Exposure to STD and Cough      FINAL ASSESSMENT       ICD-10-CM    1. Screen for STD (sexually transmitted disease)  Z11.3 Primary Care Referral      2. URI (upper respiratory infection)  J06.9 Primary Care Referral      3. Suspected damage to fetus from drugs, affecting management of mother, delivered  O35.5XX0           ED COURSE, MEDICAL DECISION MAKING, PLAN     ED course     11:31 AM Evaluated patient. Performed physical exam. Plan for labs and chest xray.   12:32 PM Nurse reports that patient is now refusing x-ray and wanting to leave.  12:40 PM Rechecked and updated patient and staff member. Pt wanting to leave. Discharge and follow up discussed. RN to discharge.   ______________________________________________________________________    Reason for visit: Vignesh Donahue is a 24 year old male with past medical history of fetal alcohol syndrome, in utero drug exposure, mood disorder, methamphetamine abuse presenting for STD screening and URI-like symptoms over the last 2 weeks.    Exam: Physical exam is benign.  He is vitally normal.    Labs: Patient left prior to results being available.  He did come back positive for COVID-19, but negative for strep.  Per staff member at his treatment facility, he will be unable to receive phone calls because they do not have some sort of form on file.  Patient plans to call back later today for results and will call back in a couple of days for the rest of the STD results.  Gonorrhea, chlamydia, hepatitis, HIV, and syphilis tests pending at time of discharge.    EKG: N/A    Imaging: Plan was to get a chest x-ray given his report of cough for 2 weeks, however patient ultimately declined this.    Consults:  N/A    Interventions/recheck: N/A    Assessment: Patient is URI-like symptom certainly consistent with COVID-19.  Very low concern for acute complication.  His lungs do sound clear and he has been afebrile so I have a lower suspicion for secondary pneumonia.  As mentioned above, he did refuse chest x-ray.  Patient has no specific concern for a particular STD, but just wants a general check.  With this being said, we opted to defer empiric treatment.  Overall, no red flag s/s present to suggest an acutely serious or life threatening condition that would necessitate hospitalization.     Plan: Patient was given a referral to primary care to establish with a provider.  Patient is going to be calling [facility] back for his lab results since he is unable to take calls at his treatment facility per the staff member.  Indications for re-evaluation in the ER discussed.   Patient/parent/guardian understanding and agreeable with the plan and will discharge to home in good condition.       *All pertinent lab & imaging studies independently reviewed. (See chart for details)   *Discussed the results of all the tests and plan with patient and family/guardians.   ______________________________________________________________________      HISTORY OF PRESENT ILLNESS   Patient information was obtained from: Patient  Use of Intrepreter: N/A     Vignesh ZHU Godfrey is a 24 year old male with past medical history of fetal alcohol syndrome, in utero drug exposure, mood disorder, methamphetamine abuse who presents to the ED by means of walk-in with staff member from his treatment facility for evaluation of STD testing and evaluation of a cough, runny nose, and sore throat that has been present for 2 weeks.    States he has a history of methamphetamine use and is concerned about diseases related to that.  Tells me he wants to be tested for all of the STDs.  Patient tells me that occasionally at the end of his urine stream he will feel a  little tingle and does have maybe a little discharge that looks like semen, but he is not certain.  No burning with urination, frequency, urgency, flank pain.  No abdominal pain.    He additionally reports having a phlegmy cough, runny nose, and sore throat over the last 2 weeks.  No fevers.  No vomiting.  No known ill contacts.    No other concerns.      MEDICAL HISTORY     Past Medical History:   Diagnosis Date    ADHD (attention deficit hyperactivity disorder)     Deliberate self-cutting     Depressed     Suspected damage to fetus from drugs, affecting management of mother, delivered     Suspected damage to fetus from other disease in mother, affecting management of mother, with delivery     Unspecified disturbance of conduct        Past Surgical History:   Procedure Laterality Date    IR UPPER EXTREMITY ANGIOGRAM BILATERAL  12/25/2022       Family History   Problem Relation Age of Onset    Neurologic Disorder Maternal Grandmother         TIA vs. complicated migraine    Circulatory Maternal Grandmother         ischemic colitis    Psychotic Disorder Father         chem dep    Psychotic Disorder Mother         chem dep       Social History     Tobacco Use    Smoking status: Never    Smokeless tobacco: Never   Substance Use Topics    Alcohol use: Yes     Comment: Whiskey     Drug use: Yes     Types: Marijuana       folic acid (FOLVITE) 1 MG tablet  ibuprofen (ADVIL/MOTRIN) 200 MG tablet  multivitamin w/minerals (THERA-VIT-M) tablet  thiamine (B-1) 100 MG tablet  venlafaxine (EFFEXOR-ER) 75 MG 24 hr tablet          PHYSICAL EXAM     First Vitals:  Patient Vitals for the past 24 hrs:   BP Temp Temp src Pulse Resp SpO2   07/25/24 1110 129/73 98.2  F (36.8  C) Oral 85 16 97 %       PHYSICAL EXAM:   Constitutional: No acute distress.  Neuro: Awake and alert. No focal deficits.  Psych: Calm and cooperative.  Eyes: PERRL. EOMI. Conjunctivae clear. No crusting or mattering of the lids or lashes.   Ears: TMs visualized and  are normal. External canals clear.    Nose: Nostrils patent. No congestion or turbinate inflammation.   Mouth: Pink and moist. No erythema or edema of the posterior pharynx. No exudates. No trismus or muffled voice. Handling own secretions. No uvula deviation.   Neck: FROM.    Lymph: No cervical or tonsillar lymphadenopathy.  Cardio: Regular rate. Adequate perfusion to extremities. Regular rhythm. No murmurs.  Pulmonary: Oxygenating well on RA. No labored breathing. CTA b/l.  Abdomen: Soft and non-distended. No palpable pain.  : Deferred by patient.   Skin: Natural color, warm, dry, intact.     RESULTS     LAB:  All pertinent labs reviewed and interpreted  Labs Ordered and Resulted from Time of ED Arrival to Time of ED Departure   GROUP A STREPTOCOCCUS PCR THROAT SWAB - Normal       Result Value    Group A strep by PCR Not Detected     ACUTE HEPATITIS PANEL   INFLUENZA A/B, RSV, & SARS-COV2 PCR   HIV ANTIGEN ANTIBODY COMBO   TREPONEMA ABS W REFLEX TO RPR AND TITER   CHLAMYDIA TRACHOMATIS/NEISSERIA GONORRHOEAE BY PCR       RADIOLOGY:  No orders to display       ECG:    N/A      PROCEDURES     None         MEDICAL DECISION MAKING:  Obtained supplemental history:Supplemental history obtained?: No  Reviewed external records: External records reviewed?: No  Care impacted by chronic illness:Mental Health  Care significantly affected by social determinants of health:Alcohol Abuse and/or Recreational Drug Use  Did you consider but not order tests?: Work up considered but not performed and documented in chart, if applicable  Did you interpret images independently?: Independent interpretation of ECG and images noted in documentation, when applicable.  Consultation discussion with other provider:Did you involve another provider (consultant, , pharmacy, etc.)?: No  Discharge. No recommendations on prescription strength medication(s). See documentation for any additional details.      FINAL IMPRESSION:    ICD-10-CM    1.  Screen for STD (sexually transmitted disease)  Z11.3 Primary Care Referral      2. URI (upper respiratory infection)  J06.9 Primary Care Referral      3. Suspected damage to fetus from drugs, affecting management of mother, delivered  O35.5XX0             MEDICATIONS GIVEN IN THE EMERGENCY DEPARTMENT:  Medications - No data to display      NEW PRESCRIPTIONS STARTED AT TODAY'S ED VISIT:  New Prescriptions    No medications on file              Some or all of this documentation has been completed using dictation software and mild grammatical errors may be present. Please contact me with any concerns regarding this.       Sana Miranda PA-C  Emergency Medicine   [facility] EMERGENCY DEPARTMENT       Sana Miranda PA-C  07/25/24 124

## 2024-07-26 LAB
C TRACH DNA SPEC QL PROBE+SIG AMP: NEGATIVE
N GONORRHOEA DNA SPEC QL NAA+PROBE: NEGATIVE

## 2024-07-30 ENCOUNTER — TELEPHONE (OUTPATIENT)
Dept: NURSING | Facility: CLINIC | Age: 24
End: 2024-07-30
Payer: COMMERCIAL

## 2024-07-30 NOTE — TELEPHONE ENCOUNTER
Nurse, Dan, from [facility], calling with patient present to ask about lab results from ED visit on 7/25/2024.Group A Streptococcus PCR Throat Swab, Chlamydia trachomatic PCR, N.Gonorrhea, Influenza A, Influenza B, RSV, Treponema antibody, HIV Antigen Antibody Combo, Hepatitis B Surface Antigen, and Hepatitis C antibody is negative/within normal limits.     Patient did test positive for Covid 19, but was already aware.    No treatment or change in treatment recommended per [facility] ED Lab Result  protocol.     Bess Boyce RN

## 2024-09-11 ENCOUNTER — OFFICE VISIT (OUTPATIENT)
Dept: FAMILY MEDICINE | Facility: CLINIC | Age: 24
End: 2024-09-11
Payer: COMMERCIAL

## 2024-09-11 ENCOUNTER — TELEPHONE (OUTPATIENT)
Dept: FAMILY MEDICINE | Facility: CLINIC | Age: 24
End: 2024-09-11

## 2024-09-11 VITALS
DIASTOLIC BLOOD PRESSURE: 83 MMHG | TEMPERATURE: 98 F | WEIGHT: 215.7 LBS | RESPIRATION RATE: 16 BRPM | HEART RATE: 94 BPM | SYSTOLIC BLOOD PRESSURE: 124 MMHG | OXYGEN SATURATION: 97 % | HEIGHT: 71 IN | BODY MASS INDEX: 30.2 KG/M2

## 2024-09-11 DIAGNOSIS — Z13.9 ENCOUNTER FOR SCREENING INVOLVING SOCIAL DETERMINANTS OF HEALTH (SDOH): ICD-10-CM

## 2024-09-11 DIAGNOSIS — F10.10 ALCOHOL ABUSE: ICD-10-CM

## 2024-09-11 DIAGNOSIS — F11.90 OPIOID USE DISORDER: Primary | ICD-10-CM

## 2024-09-11 DIAGNOSIS — F41.1 GENERALIZED ANXIETY DISORDER: ICD-10-CM

## 2024-09-11 DIAGNOSIS — F19.951 DRUG-INDUCED HALLUCINOSIS (H): ICD-10-CM

## 2024-09-11 PROCEDURE — 99214 OFFICE O/P EST MOD 30 MIN: CPT | Performed by: FAMILY MEDICINE

## 2024-09-11 RX ORDER — BUPRENORPHINE HYDROCHLORIDE, NALOXONE HYDROCHLORIDE 8; 2 MG/1; MG/1
1 FILM, SOLUBLE BUCCAL; SUBLINGUAL 2 TIMES DAILY
COMMUNITY
End: 2024-09-11

## 2024-09-11 RX ORDER — BUPRENORPHINE HYDROCHLORIDE, NALOXONE HYDROCHLORIDE 8; 2 MG/1; MG/1
1 FILM, SOLUBLE BUCCAL; SUBLINGUAL 2 TIMES DAILY
Qty: 60 FILM | Refills: 0 | Status: SHIPPED | OUTPATIENT
Start: 2024-09-11 | End: 2024-10-03

## 2024-09-11 RX ORDER — MULTIPLE VITAMINS W/ MINERALS TAB 9MG-400MCG
1 TAB ORAL EVERY MORNING
Qty: 90 TABLET | Refills: 3 | Status: SHIPPED | OUTPATIENT
Start: 2024-09-11

## 2024-09-11 RX ORDER — OLANZAPINE 15 MG/1
15 TABLET ORAL AT BEDTIME
COMMUNITY
End: 2024-09-11

## 2024-09-11 RX ORDER — OLANZAPINE 15 MG/1
15 TABLET ORAL EVERY MORNING
Qty: 90 TABLET | Refills: 1 | Status: SHIPPED | OUTPATIENT
Start: 2024-09-11 | End: 2024-09-26

## 2024-09-11 RX ORDER — BUSPIRONE HYDROCHLORIDE 15 MG/1
7.5 TABLET ORAL 2 TIMES DAILY
Qty: 90 TABLET | Refills: 1 | Status: SHIPPED | OUTPATIENT
Start: 2024-09-11

## 2024-09-11 RX ORDER — QUETIAPINE FUMARATE 100 MG/1
100 TABLET, FILM COATED ORAL DAILY
COMMUNITY
End: 2024-09-11

## 2024-09-11 RX ORDER — BUSPIRONE HYDROCHLORIDE 15 MG/1
15 TABLET ORAL 2 TIMES DAILY
COMMUNITY
End: 2024-09-11

## 2024-09-11 RX ORDER — LANOLIN ALCOHOL/MO/W.PET/CERES
100 CREAM (GRAM) TOPICAL EVERY MORNING
Qty: 90 TABLET | Refills: 3 | Status: SHIPPED | OUTPATIENT
Start: 2024-09-11

## 2024-09-11 RX ORDER — QUETIAPINE FUMARATE 100 MG/1
100 TABLET, FILM COATED ORAL AT BEDTIME
Qty: 90 TABLET | Refills: 1 | Status: SHIPPED | OUTPATIENT
Start: 2024-09-11

## 2024-09-11 ASSESSMENT — PATIENT HEALTH QUESTIONNAIRE - PHQ9
SUM OF ALL RESPONSES TO PHQ QUESTIONS 1-9: 2
10. IF YOU CHECKED OFF ANY PROBLEMS, HOW DIFFICULT HAVE THESE PROBLEMS MADE IT FOR YOU TO DO YOUR WORK, TAKE CARE OF THINGS AT HOME, OR GET ALONG WITH OTHER PEOPLE: NOT DIFFICULT AT ALL
SUM OF ALL RESPONSES TO PHQ QUESTIONS 1-9: 2

## 2024-09-11 NOTE — TELEPHONE ENCOUNTER
New Medication Request        What medication are you requesting?: med request before appt    1)  Olanzapine 7.5 MG  2)  Subozone 8 MG  3)  Quetitine 100 MG  4)  Bustirone 7.5 MG    Patient hung up and I did not get all the information that is needed.  Please contact patient back today.  Thank you.    Reason for medication request: per patient    Have you taken this medication before?: Yes:     Controlled Substance Agreement on file:   CSA -- Patient Level:    CSA: None found at the patient level.         Patient offered an appointment? Patient declined an appt.  Patient will need an appt for these medications today at [facility].    Preferred Pharmacy:   [id number] - [facility] [address] AT [facility] & [address] [place] [address]  Phone: [phone] Fax: [phone]    [facility] [place], [place] - [address]  [address]  [address]  [facility] [address]  Phone: [phone] Fax: [phone] Alternate Fax: [phone], [phone], [phone]    [facility] #[id number] - [place], [place] - [address] AT [facility] & [address]   [address] [place] [address]  Phone: [phone] Fax: [phone]      Okay to leave a detailed message?: Yes at Cell number on file:    Telephone Information:   Mobile [phone]

## 2024-09-11 NOTE — PROGRESS NOTES
Assessment & Plan     Opioid use disorder  Stable on current regimen.  PDMP is reviewed.  Will follow-up again in 3 to 4 weeks.  Plan drug screen at that time.  Anticipate will remain on this medication for the near future.  Can discuss in the future decreasing dose as tolerates.  - SUBOXONE 8-2 MG per film; Place 1 Film under the tongue 2 times daily. 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  Currently well-controlled on medication.  He is requesting to switch that olanzapine to in the morning to provide better support during the day and continue the quetiapine at bedtime.  Also notes with anxiety, depression, and history of other mental health concerns he feels like he would benefit from counseling.  Referral is placed.  - QUEtiapine (SEROQUEL) 100 MG tablet; Take 1 tablet (100 mg) by mouth at bedtime. At nightime  - OLANZapine (ZYPREXA) 15 MG tablet; Take 1 tablet (15 mg) by mouth every morning.  - Adult Mental Health  Referral; Future    Generalized anxiety disorder  Stable on twice daily dosing of buspirone.  Mental health referral is placed.  - busPIRone (BUSPAR) 15 MG tablet; Take 0.5 tablets (7.5 mg) by mouth 2 times daily. twice daily every morning and every afternoon  - Adult Mental Health  Referral; Future    Alcohol abuse  History of alcohol abuse.  On multivitamins that he will continue to take every morning.  Currently abstinent.  - multivitamin w/minerals (THERA-VIT-M) tablet; Take 1 tablet by mouth every morning.  - thiamine (B-1) 100 MG tablet; Take 1 tablet (100 mg) by mouth every morning.    Encounter for screening involving social determinants of health (SDoH)  Reviewed the social determinants of health.  He currently has adequate support at his group home and will be moving into a sober house when he completes 90 days in the group home.  Can review at a  "future date whether consultation with care management would be helpful.          Nicotine/Tobacco Cessation  He reports that he has been smoking cigarettes. He has never used smokeless tobacco.  Nicotine/Tobacco Cessation Plan  Not priority at this time and early sobriety.      BMI  Estimated body mass index is 30.08 kg/m  as calculated from the following:    Height as of this encounter: 1.803 m (5' 11\").    Weight as of this encounter: 97.8 kg (215 lb 11.2 oz).             Rosario Medellin is a 24 year old, presenting for the following health issues:  Recheck Medication and Establish Care (Gene psych testing)        9/11/2024    10:40 AM   Additional Questions   Roomed by Baldev     History of Present Illness       Reason for visit:  Primary,gene sight,meds He is missing 7 dose(s) of medications per week.    Patient here to establish care.  Moved here 2 weeks ago to live at [facility].  Had been in treatment for 35 days.  Prior use was methamphetamine and fentanyl, meth was drug of choice.  Has not used since discharge from treatment.  Started using drugs at age 16.  Dropped out of 12th grade, previous employment through fast food and pharmacies, not currently working.  Attending meetings, has a counselor.  In 90 day program, then planning to move to a sober house.  Support from grandma and dad.  Planing on staying in [place] long term     Patient notes he has had some interested in whether he should consider getting pharmacogenetics testing but notes that right now his symptoms are very well-controlled on current regimen and would not want to make any changes.                    Objective    /83 (BP Location: Right arm, Patient Position: Sitting, Cuff Size: Adult Large)   Pulse 94   Temp 98  F (36.7  C) (Tympanic)   Resp 16   Ht 1.803 m (5' 11\")   Wt 97.8 kg (215 lb 11.2 oz)   SpO2 97%   BMI 30.08 kg/m    Body mass index is 30.08 kg/m .  Physical Exam     Alert cooperative in no acute " distress            Signed Electronically by: Batool Apple MD

## 2024-09-19 ENCOUNTER — VIRTUAL VISIT (OUTPATIENT)
Dept: BEHAVIORAL HEALTH | Facility: CLINIC | Age: 24
End: 2024-09-19
Payer: COMMERCIAL

## 2024-09-19 DIAGNOSIS — F19.951 DRUG-INDUCED HALLUCINOSIS (H): ICD-10-CM

## 2024-09-19 DIAGNOSIS — F41.1 GENERALIZED ANXIETY DISORDER: ICD-10-CM

## 2024-09-19 PROCEDURE — 90791 PSYCH DIAGNOSTIC EVALUATION: CPT | Mod: 95 | Performed by: SOCIAL WORKER

## 2024-09-19 ASSESSMENT — PATIENT HEALTH QUESTIONNAIRE - PHQ9
SUM OF ALL RESPONSES TO PHQ QUESTIONS 1-9: 0
10. IF YOU CHECKED OFF ANY PROBLEMS, HOW DIFFICULT HAVE THESE PROBLEMS MADE IT FOR YOU TO DO YOUR WORK, TAKE CARE OF THINGS AT HOME, OR GET ALONG WITH OTHER PEOPLE: SOMEWHAT DIFFICULT
SUM OF ALL RESPONSES TO PHQ QUESTIONS 1-9: 0
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
## 2024-09-26 ENCOUNTER — TELEPHONE (OUTPATIENT)
Dept: FAMILY MEDICINE | Facility: CLINIC | Age: 24
End: 2024-09-26
Payer: COMMERCIAL

## 2024-09-26 DIAGNOSIS — F19.951 DRUG-INDUCED HALLUCINOSIS (H): ICD-10-CM

## 2024-09-26 RX ORDER — OLANZAPINE 15 MG/1
15 TABLET ORAL AT BEDTIME
Qty: 90 TABLET | Refills: 1 | Status: SHIPPED | OUTPATIENT
Start: 2024-09-26

## 2024-09-26 NOTE — TELEPHONE ENCOUNTER
RN called and let patient know Dr. Ambika GARCIA with change. He just needs to updated medication list faxed to the facility with new directions. RN faxed medication list as requested to [facility] [phone]

## 2024-09-26 NOTE — TELEPHONE ENCOUNTER
Patient called to ask his he can change time he takes olanzapine from morning to bedtime.     He stated that he is too groggy during the day and falls asleep and would like to avoid this. He has taken this medication at bedtime in the past but direction were recently changes to help provide better day time coverage. OV note did indicate patient established care with MD.     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  Currently well-controlled on medication.  He is requesting to switch that olanzapine to in the morning to provide better support during the day and continue the quetiapine at bedtime.    We discussed that I wanted to get feedback from pcp to ensure she was comfortable with this plan or if a longer trial period is preferred. Updated RX pended if needed.

## 2024-09-26 NOTE — TELEPHONE ENCOUNTER
Agree with change. Please find out what patient needs for the [facility] for documentation. Thanks

## 2024-10-03 ENCOUNTER — OFFICE VISIT (OUTPATIENT)
Dept: FAMILY MEDICINE | Facility: CLINIC | Age: 24
End: 2024-10-03
Payer: COMMERCIAL

## 2024-10-03 VITALS
SYSTOLIC BLOOD PRESSURE: 145 MMHG | HEART RATE: 112 BPM | WEIGHT: 246 LBS | TEMPERATURE: 97.4 F | OXYGEN SATURATION: 94 % | DIASTOLIC BLOOD PRESSURE: 83 MMHG | RESPIRATION RATE: 18 BRPM | BODY MASS INDEX: 34.44 KG/M2 | HEIGHT: 71 IN

## 2024-10-03 DIAGNOSIS — F11.90 OPIOID USE DISORDER: ICD-10-CM

## 2024-10-03 DIAGNOSIS — F41.1 GENERALIZED ANXIETY DISORDER: Primary | ICD-10-CM

## 2024-10-03 PROCEDURE — 99213 OFFICE O/P EST LOW 20 MIN: CPT | Performed by: FAMILY MEDICINE

## 2024-10-03 RX ORDER — BUPRENORPHINE HYDROCHLORIDE, NALOXONE HYDROCHLORIDE 8; 2 MG/1; MG/1
1 FILM, SOLUBLE BUCCAL; SUBLINGUAL 2 TIMES DAILY
Qty: 60 FILM | Refills: 2 | Status: SHIPPED | OUTPATIENT
Start: 2024-10-03

## 2024-10-03 NOTE — PROGRESS NOTES
"  Assessment & Plan     Opioid use disorder  Stable and doing well, follow up in 3 months  - SUBOXONE 8-2 MG per film; Place 1 Film under the tongue 2 times daily. Every morning and afternoon  - Adult Mental Health  Referral; Future    Generalized anxiety disorder  Long wait for counseling, hoping for sooner evaluation. Will refer to [facility].  - Adult Mental Health  Referral; Future          BMI  Estimated body mass index is 34.31 kg/m  as calculated from the following:    Height as of this encounter: 1.803 m (5' 11\").    Weight as of this encounter: 111.6 kg (246 lb).             Rosario Medellin is a 24 year old, presenting for the following health issues:  Recheck Medication        10/3/2024     8:46 AM   Additional Questions   Roomed by Monica[facility]     History of Present Illness       Reason for visit:  Appointment   He is taking medications regularly.     Overall doing well. Working his program. Mood is stable. Has not used. Noticing less triggers.                   Objective    BP (!) 145/83 (BP Location: Right arm, Patient Position: Sitting, Cuff Size: Adult Large)   Pulse 112   Temp 97.4  F (36.3  C) (Tympanic)   Resp 18   Ht 1.803 m (5' 11\")   Wt 111.6 kg (246 lb)   SpO2 94%   BMI 34.31 kg/m    Body mass index is 34.31 kg/m .  Physical Exam     Alert cooperative no distress            Signed Electronically by: Batool Apple MD    "

## 2024-10-07 ENCOUNTER — VIRTUAL VISIT (OUTPATIENT)
Dept: PSYCHOLOGY | Facility: CLINIC | Age: 24
End: 2024-10-07
Payer: COMMERCIAL

## 2024-10-07 DIAGNOSIS — F32.A DEPRESSION, UNSPECIFIED DEPRESSION TYPE: ICD-10-CM

## 2024-10-07 DIAGNOSIS — F43.12 CHRONIC POSTTRAUMATIC STRESS SYNDROME: Primary | ICD-10-CM

## 2024-10-07 PROCEDURE — 90791 PSYCH DIAGNOSTIC EVALUATION: CPT | Mod: 95 | Performed by: MARRIAGE & FAMILY THERAPIST

## 2024-10-07 NOTE — PROGRESS NOTES
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 information about the  effects of chemical use on their health and well being. Recommendations: Continue residing a at [facility] to address his current substance use disorders.    8. Records:   These were not available for review at time of assessment.   Information in this assessment was obtained from the medical record and  provided by patient who is a fair historian.    Patient will have open access to their mental health medical record.    9.   Interactive Complexity: No    10. Safety Plan: Client will call 911 or present themselves to emergency department for immediate assistance/assessment of risk to self or others      Provider Name/ Credentials: ALETHA Nolasco  October 7, 2024

## 2024-10-11 ENCOUNTER — MYC REFILL (OUTPATIENT)
Dept: FAMILY MEDICINE | Facility: CLINIC | Age: 24
End: 2024-10-11
Payer: COMMERCIAL

## 2024-10-11 DIAGNOSIS — F11.90 OPIOID USE DISORDER: ICD-10-CM

## 2024-10-11 RX ORDER — BUPRENORPHINE HYDROCHLORIDE, NALOXONE HYDROCHLORIDE 8; 2 MG/1; MG/1
1 FILM, SOLUBLE BUCCAL; SUBLINGUAL 2 TIMES DAILY
Qty: 60 FILM | Refills: 2 | Status: CANCELLED | OUTPATIENT
Start: 2024-10-11

## 2024-10-14 NOTE — TELEPHONE ENCOUNTER
Advised to contact pharmacy, RX on file.    Last RX: 10/3/2024  #60  R-2      Last office visit: 10/3/2024     Future Appointments 10/14/2024 - 4/12/2025        Date Visit Type Length Department Provider     10/21/2024 10:00 AM ADULT PSYCHOTHERAPY RETURN 60 min [facility] Diego Ponce LMFT    Location Instructions:     [address] [place] [id number]                     Requested Prescriptions   Pending Prescriptions Disp Refills    SUBOXONE 8-2 MG per film 60 Film 2     Sig: Place 1 Film under the tongue 2 times daily. Every morning and afternoon       There is no refill protocol information for this order

## 2024-10-18 ENCOUNTER — MYC MEDICAL ADVICE (OUTPATIENT)
Dept: FAMILY MEDICINE | Facility: CLINIC | Age: 24
End: 2024-10-18
Payer: COMMERCIAL

## 2024-10-18 DIAGNOSIS — F19.951 DRUG-INDUCED HALLUCINOSIS (H): ICD-10-CM

## 2024-10-18 RX ORDER — OLANZAPINE 5 MG/1
5 TABLET ORAL AT BEDTIME
Qty: 30 TABLET | Refills: 5 | Status: SHIPPED | OUTPATIENT
Start: 2024-10-18

## 2024-10-18 NOTE — TELEPHONE ENCOUNTER
Please see MyChart message. Patient taking olanzapine at night, still noticing he is tired during the day. Would like to stop taking medication. Last OV 10/3/24 with Dr. Apple.   Routing to provider to advise.       OV from 9/11/24.

## 2024-10-21 ENCOUNTER — VIRTUAL VISIT (OUTPATIENT)
Dept: PSYCHOLOGY | Facility: CLINIC | Age: 24
End: 2024-10-21
Payer: COMMERCIAL

## 2024-10-21 DIAGNOSIS — F43.12 CHRONIC POSTTRAUMATIC STRESS SYNDROME: ICD-10-CM

## 2024-10-21 DIAGNOSIS — F34.81 DISRUPTIVE MOOD DYSREGULATION DISORDER (H): Primary | ICD-10-CM

## 2024-10-21 PROBLEM — F32.A DEPRESSION, UNSPECIFIED DEPRESSION TYPE: Status: ACTIVE | Noted: 2024-10-21

## 2024-10-21 PROCEDURE — 90832 PSYTX W PT 30 MINUTES: CPT | Mod: 95 | Performed by: MARRIAGE & FAMILY THERAPIST

## 2024-10-21 ASSESSMENT — PATIENT HEALTH QUESTIONNAIRE - PHQ9
SUM OF ALL RESPONSES TO PHQ QUESTIONS 1-9: 2
SUM OF ALL RESPONSES TO PHQ QUESTIONS 1-9: 2
10. IF YOU CHECKED OFF ANY PROBLEMS, HOW DIFFICULT HAVE THESE PROBLEMS MADE IT FOR YOU TO DO YOUR WORK, TAKE CARE OF THINGS AT HOME, OR GET ALONG WITH OTHER PEOPLE: SOMEWHAT DIFFICULT
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
## 2024-11-09 ENCOUNTER — HEALTH MAINTENANCE LETTER (OUTPATIENT)
Age: 24
End: 2024-11-09

## 2024-11-14 ENCOUNTER — MYC MEDICAL ADVICE (OUTPATIENT)
Dept: FAMILY MEDICINE | Facility: CLINIC | Age: 24
End: 2024-11-14
Payer: COMMERCIAL

## 2024-11-14 DIAGNOSIS — F11.90 OPIOID USE DISORDER: ICD-10-CM

## 2024-11-15 RX ORDER — BUPRENORPHINE HYDROCHLORIDE, NALOXONE HYDROCHLORIDE 8; 2 MG/1; MG/1
1 FILM, SOLUBLE BUCCAL; SUBLINGUAL 2 TIMES DAILY
Qty: 14 FILM | Refills: 1 | Status: SHIPPED | OUTPATIENT
Start: 2024-11-15

## 2024-11-15 NOTE — TELEPHONE ENCOUNTER
I called the pharmacy and spoke with technician who states that the patient's insurance is no longer activated so his prescription even with the discount card is around 500$ plus. I verbalized understanding.     I then called the patient and spoke with him and his , and informed them there is no way around the out of pocket cost with the lack of insurance. I stated that we could try sending a smaller amount of mediction for them to fill to see if that is cheaper. Patient and  verblaized understanding and asked for this to be completed.     Dr. 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    Routing to Hillary for review.